# Patient Record
Sex: MALE | Race: WHITE | NOT HISPANIC OR LATINO | Employment: OTHER | URBAN - METROPOLITAN AREA
[De-identification: names, ages, dates, MRNs, and addresses within clinical notes are randomized per-mention and may not be internally consistent; named-entity substitution may affect disease eponyms.]

---

## 2017-10-19 ENCOUNTER — TELEPHONE (OUTPATIENT)
Dept: NEUROLOGY | Facility: CLINIC | Age: 72
End: 2017-10-19

## 2017-10-19 NOTE — TELEPHONE ENCOUNTER
Appt offered/accepted with SALLY De La Cruz for 11/1/17 @ 1:15pm. Appt letter to be mailed. Clarice, pt's wife advised to bring YOBANI scan results and current med lists.

## 2017-10-19 NOTE — TELEPHONE ENCOUNTER
----- Message from Stephen Rosales sent at 10/19/2017  3:14 PM CDT -----  Contact: Elizabeth (wife) @ [phone]  Elizabeth is calling to schedule a NP appt due to parkinson's. Pls call.

## 2017-11-14 ENCOUNTER — OFFICE VISIT (OUTPATIENT)
Dept: NEUROLOGY | Facility: CLINIC | Age: 72
End: 2017-11-14
Payer: MEDICARE

## 2017-11-14 VITALS
HEIGHT: 72 IN | DIASTOLIC BLOOD PRESSURE: 92 MMHG | BODY MASS INDEX: 31.32 KG/M2 | SYSTOLIC BLOOD PRESSURE: 123 MMHG | HEART RATE: 59 BPM | WEIGHT: 231.25 LBS

## 2017-11-14 DIAGNOSIS — Z86.73 HISTORY OF STROKE: ICD-10-CM

## 2017-11-14 DIAGNOSIS — G20.A1 PD (PARKINSON'S DISEASE): ICD-10-CM

## 2017-11-14 DIAGNOSIS — E11.42 TYPE 2 DIABETES MELLITUS WITH DIABETIC POLYNEUROPATHY, WITHOUT LONG-TERM CURRENT USE OF INSULIN: ICD-10-CM

## 2017-11-14 DIAGNOSIS — F32.A DEPRESSION, UNSPECIFIED DEPRESSION TYPE: ICD-10-CM

## 2017-11-14 PROBLEM — E11.49 DIABETES MELLITUS WITH NEUROLOGICAL MANIFESTATIONS: Status: ACTIVE | Noted: 2017-11-14

## 2017-11-14 PROCEDURE — 99203 OFFICE O/P NEW LOW 30 MIN: CPT | Mod: S$PBB,GC,, | Performed by: STUDENT IN AN ORGANIZED HEALTH CARE EDUCATION/TRAINING PROGRAM

## 2017-11-14 PROCEDURE — 99999 PR PBB SHADOW E&M-EST. PATIENT-LVL III: CPT | Mod: PBBFAC,GC,, | Performed by: STUDENT IN AN ORGANIZED HEALTH CARE EDUCATION/TRAINING PROGRAM

## 2017-11-14 PROCEDURE — 99213 OFFICE O/P EST LOW 20 MIN: CPT | Mod: PBBFAC | Performed by: STUDENT IN AN ORGANIZED HEALTH CARE EDUCATION/TRAINING PROGRAM

## 2017-11-14 RX ORDER — DONEPEZIL HYDROCHLORIDE 5 MG/1
5 TABLET, FILM COATED ORAL NIGHTLY
COMMUNITY

## 2017-11-14 RX ORDER — WARFARIN SODIUM 5 MG/1
5 TABLET ORAL
COMMUNITY
End: 2020-12-07

## 2017-11-14 RX ORDER — LISINOPRIL 20 MG/1
20 TABLET ORAL DAILY
COMMUNITY
Start: 2014-05-22 | End: 2019-01-22

## 2017-11-14 RX ORDER — SERTRALINE HYDROCHLORIDE 50 MG/1
50 TABLET, FILM COATED ORAL DAILY
COMMUNITY

## 2017-11-14 RX ORDER — CARBIDOPA AND LEVODOPA 25; 100 MG/1; MG/1
25-100 TABLET ORAL 4 TIMES DAILY
COMMUNITY
Start: 2017-09-20 | End: 2018-02-27

## 2017-11-14 RX ORDER — METFORMIN HYDROCHLORIDE 500 MG/1
500 TABLET, EXTENDED RELEASE ORAL DAILY
COMMUNITY
Start: 2017-10-04

## 2017-11-14 RX ORDER — GLIMEPIRIDE 2 MG/1
2 TABLET ORAL 2 TIMES DAILY
COMMUNITY
Start: 2017-09-28

## 2017-11-14 RX ORDER — PROPRANOLOL HYDROCHLORIDE 60 MG/1
60 CAPSULE, EXTENDED RELEASE ORAL DAILY
COMMUNITY
Start: 2017-10-07

## 2017-11-14 RX ORDER — SIMVASTATIN 40 MG/1
40 TABLET, FILM COATED ORAL NIGHTLY
COMMUNITY

## 2017-11-14 NOTE — PATIENT INSTRUCTIONS
Test sinemet (carbidopa-levodopa) to see if it works on your tremor as well as your legs (make them move better when you start walking).    Take one pill about 4 hours apart, 3 times a day.  Example:  Take one at 8am, 12pm and 4pm.  Do this for 3-4 days.    THEN, DOUBLE THE dose to 2 pills, 3 times per day.    If you get nauseated, please stop taking it.  If you feel it HELPS your tremor or your walking, please let me know by [employer] message.  If you cannot tell any difference, then let me know (and stop taking it).

## 2017-11-14 NOTE — PROGRESS NOTES
Patient Name: Clint Torres  MRN: [id number]    CC: Parkinson's disease    HPI: Clint Torres is a 72 y.o. male who was diagnosed with Parkinson's disease, who presents to [facility] care. He says that in about 2013, he started to notice a right hand tremor. Since then he has had worsening tremor and associated freezing while walking, slow movements/responses, swallowing difficulty, mild constipation and urinary retention. He also reports intermittent dizziness. He does not sleep well at night due to carpal tunnel, but denies any vivid dreams which his wife agrees with.  He denies any other strange behavior including hypersexuality, gambling or money spending. He denies any visual or auditory hallucinations.    He has been seen by an outside Neurologist, and has tried primidone, sinemet QID and the neupro patch with no improvement. He and his wife say that even shortly after taking his sinemet, he does not see any change in his symptoms. He is most concerned about his tremors and freezing, and says one time he was frozen in one spot for 1 hour.     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 He used to golf but is no longer able to. He does endorse depressed mood, and is taking sertraline. He is currently doing speech therapy for dysphagia.      ROS:   Review of Systems   Constitutional: Negative for malaise/fatigue. Negative for weight loss.   HENT: Negative for hearing loss.   Eyes: Negative for blurred vision and double vision.   Respiratory: Negative for shortness of breath and stridor.   Cardiovascular: Negative for chest pain and palpitations.   Gastrointestinal: Negative for nausea, vomiting and constipation.   Genitourinary: Negative for frequency. Negative for urgency.   Musculoskeletal: Negative for joint pain. Negative for myalgias and falls.   Skin: Negative for rash.   Neurological: As above  Endo/Heme/Allergies: Does not bruise/bleed easily.   Psychiatric/Behavioral:  Negative for memory loss, hallucinations. +depression. The patient is not nervous/anxious.      Past Medical History  No past medical history on file.    Medications    Current Outpatient Prescriptions:     carbidopa-levodopa  mg (SINEMET)  mg per tablet, Take  tablets by mouth 4 (four) times daily., Disp: , Rfl:     donepezil (ARICEPT) 5 MG tablet, Take 5 mg by mouth every evening., Disp: , Rfl:     glimepiride (AMARYL) 2 MG tablet, Take 2 mg by mouth 2 (two) times daily., Disp: , Rfl:     lisinopril (PRINIVIL,ZESTRIL) 20 MG tablet, Take 20 mg by mouth once daily., Disp: , Rfl:     metFORMIN (GLUCOPHAGE-XR) 500 MG 24 hr tablet, Take 500 mg by mouth once daily., Disp: , Rfl:     propranolol (INDERAL LA) 60 MG 24 hr capsule, Take 60 mg by mouth once daily., Disp: , Rfl:     sertraline (ZOLOFT) 50 MG tablet, Take 50 mg by mouth once daily., Disp: , Rfl:     simvastatin (ZOCOR) 40 MG tablet, Take 40 mg by mouth every evening., Disp: , Rfl:     warfarin (COUMADIN) 5 MG tablet, Take 5 mg by mouth every Mon, Wed, Fri. Sun Tues Thurs Sat....half tablet, Disp: , Rfl:     rotigotine (NEUPRO) 4 mg/24 hour, Place 1 patch onto the skin once daily., Disp: , Rfl:     Allergies  Review of patient's allergies indicates:  No Known Allergies    Social History  Social History     Social History    Marital status:      Spouse name: N/A    Number of children: N/A    Years of education: N/A     Occupational History    Not on file.     Social History Main Topics    Smoking status: Never Smoker    Smokeless tobacco: Never Used    Alcohol use 0.6 oz/week     1 Cans of beer per week    Drug use: Unknown    Sexual activity: Not on file     Other Topics Concern    Not on file     Social History Narrative    No narrative on file       Family History  No family history on file.    Physical Exam  BP (!) 123/92   Pulse (!) 59   Ht 6' (1.829 m)   Wt 104.9 kg (231 lb 4.2 oz)   BMI 31.36 kg/m²        Constitutional  Well-developed, well-nourished, appears stated age   Neurological    * Mental status      - Orientation  Oriented to person, place, time, and situation     - Memory   2/3 recall at 5 minutes, able to name past 3 presidents.     - Attention/concentration  Attentive, vigilant during exam     - Language  Naming & repetition intact, +2-step commands     - Fund of knowledge  Aware of current events     - Executive  Well-organized thoughts     - Other     * Cranial nerves       - CN II  PERRL, visual fields full to confrontation     - CN III, IV, VI  Extraocular movements full, normal pursuits and saccades     - CN V  Sensation V1 - V3 intact     - CN VII  Face strong and symmetric bilaterally. Masked facies     - CN VIII  Hearing intact bilaterally     - CN IX, X  Palate raises midline and symmetric     - CN XI  SCM and trapezius 5/5 bilaterally     - CN XII  Tongue midline   * Motor  Muscle bulk normal, strength 5/5 throughout. Tone increased in BUE with cogwheel rigidity (R>>L)   * Movement  Bradykinetic. Resting hand tremor (R>>L)   * Sensory   Intact to temperature and vibration throughout   * Coordination  No dysmetria with finger-to-nose or heel-to-shin   * Gait  Slow, mild shuffling gait with reduced arm swing.    * Deep tendon reflexes  2+ and symmetric throughout   Babinski equivocal bilaterally       Lab and Test Results    No results found for: WBC, HGB, HCT, PLT  No results found for: GLU, NA, K, CL, CO2, BUN, CREATININE, CALCIUM, MG, PHOS  No results found for: ALB, ALKPHOS, ALT, AST      Images:   MRI brain without contrast (OSH)  Mild midbrain atrophy. Otherwise no acute intracranial abnormalities.    YOBANI Scan (OSH)  Decreased activity bilaterally L>R    Independently reviewed YES      Assessment and Plan    Clint Torres is a 72 y.o. male who was diagnosed with Parkinson's disease a few years ago, and has tried multiple medications with no benefit. At this time I agree with the  diagnosis of PD. I will give the patient instructions for a sinemet trial, and have instructed them on what changes to look out for. If this still provides no relief of symptoms, we will need to discuss other options. I also advised them to not continue the neupro patch or start the rasagiline. They are in agreement and all questions were addressed, I will follow up in 1 month.      Problem List Items Addressed This Visit        Neuro    History of stroke    Overview     2012         Current Assessment & Plan     -- on warfarin for Afib  -- simvastatin 40mg         PD (Parkinson's disease)    Overview     2013 right hand tremor, freezing of gait         Current Assessment & Plan     -- will give sinemet trial as instructed  -- f/u in 1 month  -- if no improvement with trial will consider other options            Psychiatric    Depression    Current Assessment & Plan     -- on zoloft            Endocrine    Diabetes mellitus with neurological manifestations    Current Assessment & Plan     -- managed by primary care  -- on metformin                     Bela Blunt MD  Neurology Resident   [facility]  [address] [place] [address]  Pager: [phone]

## 2017-11-15 PROBLEM — F32.A DEPRESSION: Status: ACTIVE | Noted: 2017-11-15

## 2017-11-15 NOTE — ASSESSMENT & PLAN NOTE
-- will give sinemet trial as instructed  -- f/u in 1 month  -- if no improvement with trial will consider other options

## 2017-11-29 ENCOUNTER — TELEPHONE (OUTPATIENT)
Dept: NEUROLOGY | Facility: CLINIC | Age: 72
End: 2017-11-29

## 2017-11-29 NOTE — TELEPHONE ENCOUNTER
----- Message from Joana Foss sent at 11/29/2017  4:09 PM CST -----  Contact: Pt wife Clarice Torres is calling to schedule a f/u visit with doctor. Says pt was told to f/u in 4 wks     Mrs Torres is requesting a callback at [phone] to schedule f/u visit    Thanks

## 2017-11-29 NOTE — TELEPHONE ENCOUNTER
Advised via VM that a f/u appt has been made for 12/12/17 @ 2:00pm with Dr. Mirza. Callback # was provided if this day/time needed to be changed.

## 2017-12-12 ENCOUNTER — OFFICE VISIT (OUTPATIENT)
Dept: NEUROLOGY | Facility: CLINIC | Age: 72
End: 2017-12-12
Payer: MEDICARE

## 2017-12-12 VITALS
HEART RATE: 78 BPM | BODY MASS INDEX: 32.94 KG/M2 | SYSTOLIC BLOOD PRESSURE: 133 MMHG | HEIGHT: 71 IN | WEIGHT: 235.25 LBS | DIASTOLIC BLOOD PRESSURE: 89 MMHG

## 2017-12-12 DIAGNOSIS — G20.A1 PD (PARKINSON'S DISEASE): Primary | ICD-10-CM

## 2017-12-12 DIAGNOSIS — G62.9 NEUROPATHY: ICD-10-CM

## 2017-12-12 DIAGNOSIS — R25.1 TREMOR: ICD-10-CM

## 2017-12-12 DIAGNOSIS — E11.42 TYPE 2 DIABETES MELLITUS WITH DIABETIC POLYNEUROPATHY, WITHOUT LONG-TERM CURRENT USE OF INSULIN: ICD-10-CM

## 2017-12-12 DIAGNOSIS — R26.9 ABNORMALITY OF GAIT AND MOBILITY: ICD-10-CM

## 2017-12-12 PROCEDURE — 99999 PR PBB SHADOW E&M-EST. PATIENT-LVL IV: CPT | Mod: PBBFAC,GC,, | Performed by: STUDENT IN AN ORGANIZED HEALTH CARE EDUCATION/TRAINING PROGRAM

## 2017-12-12 PROCEDURE — 99213 OFFICE O/P EST LOW 20 MIN: CPT | Mod: S$PBB,GC,, | Performed by: STUDENT IN AN ORGANIZED HEALTH CARE EDUCATION/TRAINING PROGRAM

## 2017-12-12 PROCEDURE — 99214 OFFICE O/P EST MOD 30 MIN: CPT | Mod: PBBFAC | Performed by: STUDENT IN AN ORGANIZED HEALTH CARE EDUCATION/TRAINING PROGRAM

## 2017-12-12 NOTE — PATIENT INSTRUCTIONS
Increase sinemet to 2 pills 4 times per day for 4 days  Then decrease to 1 pill three times per day for 2 days  Then stop    PLEASE CALL the clinic to report if symptoms improve or worsen

## 2017-12-13 NOTE — ASSESSMENT & PLAN NOTE
-- Increase sinemet for 4 days, then rapidly titrate off  -- patient/wife will call with how this affects symptoms  -- if symptoms worsen will resume sinemet  -- if symptoms are stable will consider starting amantadine

## 2017-12-13 NOTE — PROGRESS NOTES
Patient Name: Clint Torres  MRN: [id number]    CC: Parkinson's disease    Interval history: Clint Torres is a 72 y.o. male who presents for follow up. He says that he did the sinemet trial as instructed, and did not see any difference in his symptoms. His wife agrees. He still complains of a bad tremor and freezing that interfere with his life. He denies any nausea, dyskinesias or other side effects.      Clinic 11/14/17  HPI: Mr. Torres is a 72 year old male who was diagnosed with Parkinson's disease, who presents to [facility]. He says that in about 2013, he started to notice a right hand tremor. Since then he has had worsening tremor and associated freezing while walking, slow movements/responses, swallowing difficulty, mild constipation and urinary retention. He also reports intermittent dizziness. He does not sleep well at night due to carpal tunnel, but denies any vivid dreams which his wife agrees with.  He denies any other strange behavior including hypersexuality, gambling or money spending. He denies any visual or auditory hallucinations.    He has been seen by an outside Neurologist, and has tried primidone, sinemet QID and the neupro patch with no improvement. He and his wife say that even shortly after taking his sinemet, he does not see any change in his symptoms. He is most concerned about his tremors and freezing, and says one time he was frozen in one spot for 1 hour.     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 He used to golf but is no longer able to. He does endorse depressed mood, and is taking sertraline. He is currently doing speech therapy for dysphagia.      ROS:   Review of Systems   Constitutional: Negative for malaise/fatigue. Negative for weight loss.   HENT: Negative for hearing loss.   Eyes: Negative for blurred vision and double vision.   Respiratory: Negative for shortness of breath and stridor.   Cardiovascular: Negative for chest pain and  "palpitations.   Gastrointestinal: Negative for nausea, vomiting and constipation.   Genitourinary: Negative for frequency. Negative for urgency.   Musculoskeletal: Negative for joint pain. Negative for myalgias and falls.   Skin: Negative for rash.   Neurological: As above  Endo/Heme/Allergies: Does not bruise/bleed easily.   Psychiatric/Behavioral: Negative for memory loss, hallucinations. +depression. The patient is not nervous/anxious.      Medications    Current Outpatient Prescriptions:     carbidopa-levodopa  mg (SINEMET)  mg per tablet, Take  tablets by mouth 4 (four) times daily., Disp: , Rfl:     donepezil (ARICEPT) 5 MG tablet, Take 5 mg by mouth every evening., Disp: , Rfl:     glimepiride (AMARYL) 2 MG tablet, Take 2 mg by mouth 2 (two) times daily., Disp: , Rfl:     lisinopril (PRINIVIL,ZESTRIL) 20 MG tablet, Take 20 mg by mouth once daily., Disp: , Rfl:     metFORMIN (GLUCOPHAGE-XR) 500 MG 24 hr tablet, Take 500 mg by mouth once daily., Disp: , Rfl:     propranolol (INDERAL LA) 60 MG 24 hr capsule, Take 60 mg by mouth once daily., Disp: , Rfl:     sertraline (ZOLOFT) 50 MG tablet, Take 50 mg by mouth once daily., Disp: , Rfl:     simvastatin (ZOCOR) 40 MG tablet, Take 40 mg by mouth every evening., Disp: , Rfl:     warfarin (COUMADIN) 5 MG tablet, Take 5 mg by mouth every Mon, Wed, Fri. Sun Tues Thurs Sat....half tablet, Disp: , Rfl:     rotigotine (NEUPRO) 4 mg/24 hour, Place 1 patch onto the skin once daily., Disp: , Rfl:     Physical Exam  /89   Pulse 78   Ht 5' 11" (1.803 m)   Wt 106.7 kg (235 lb 3.7 oz)   BMI 32.81 kg/m²       Constitutional  Well-developed, well-nourished, appears stated age   Neurological    * Mental status      - Orientation  Oriented to person, place, time, and situation     - Other     * Cranial nerves       - CN II  PERRL, visual fields full to confrontation     - CN III, IV, VI  Extraocular movements full, normal pursuits and saccades "     - CN V  Sensation V1 - V3 intact     - CN VII  Face strong and symmetric bilaterally. Dysmimia     - CN VIII  Hearing intact bilaterally     - CN IX, X  Palate raises midline and symmetric     - CN XI  SCM and trapezius 5/5 bilaterally     - CN XII  Tongue midline   * Motor  Muscle bulk normal, strength 5/5 throughout. Tone mildly increased in BUE with cogwheel rigidity (R>>L)   * Movement  Mildly bradykinetic. Only rarely intermittent resting hand tremor (R>>L)   * Sensory   Mildly decreased to vibration in bilateral lower extremities at toes.   * Coordination  No dysmetria with finger-to-nose or heel-to-shin   * Gait  Good casual gait with normal stride length, mildly reduced arm swing.    * Deep tendon reflexes  2+ and symmetric throughout   Babinski equivocal bilaterally       Lab and Test Results    No results found for: WBC, HGB, HCT, PLT  No results found for: GLU, NA, K, CL, CO2, BUN, CREATININE, CALCIUM, MG, PHOS  No results found for: ALB, ALKPHOS, ALT, AST      Images:   MRI brain without contrast ([facility])  Mild midbrain atrophy. Otherwise no acute intracranial abnormalities.    YOBANI Scan (OSH)  Decreased activity bilaterally L>R    Independently reviewed YES    Assessment and Plan    Clint Torres is a 72 y.o. male who was diagnosed with Parkinson's disease a few years ago, and has tried multiple medications with no benefit. At this time I agree with the diagnosis of PD. He reports that he did not respond to the sinemet trial, however he appears improved on my examination with less rigidity, infrequent tremor and less rigidity. We decided to test sinemet one more time, and I have instructed the patient to increase to 4 times per day, followed by a rapid decrease. This will either prove to the patient that the sinemet is helping, or prove to use that there is no benefit. If he sees no difference after rapidly decreasing the sinemet, we can consider amantadine. They are in agreement and all questions were  addressed, I will follow up in 2 months and have asked them to call with how this trial goes.    He also had reported numbness in his feet which is likely related to his diabetes. I have ordered some basic labs to rule out other causes.      Problem List Items Addressed This Visit        Neuro    PD (Parkinson's disease) - Primary    Overview     2013 right hand tremor, freezing of gait         Current Assessment & Plan     -- Increase sinemet for 4 days, then rapidly titrate off  -- patient/wife will call with how this affects symptoms  -- if symptoms worsen will resume sinemet  -- if symptoms are stable will consider starting amantadine         Neuropathy    Current Assessment & Plan     -- likely due to DM         Relevant Orders    HEMOGLOBIN A1C (Completed)    TSH (Completed)    VITAMIN B1    VITAMIN B12 (Completed)    VITAMIN B6       Endocrine    Diabetes mellitus with neurological manifestations    Current Assessment & Plan     -- managed by primary care  -- on metformin         Relevant Orders    HEMOGLOBIN A1C (Completed)      Other Visit Diagnoses     Tremor         Relevant Orders    TSH (Completed)    Abnormality of gait and mobility         Relevant Orders    VITAMIN B12 (Completed)              Bela Blunt MD  Neurology Resident   [facility] [address] [place] [address]  Pager: [phone]

## 2018-02-27 ENCOUNTER — OFFICE VISIT (OUTPATIENT)
Dept: NEUROLOGY | Facility: CLINIC | Age: 73
End: 2018-02-27
Payer: MEDICARE

## 2018-02-27 VITALS
WEIGHT: 239 LBS | SYSTOLIC BLOOD PRESSURE: 107 MMHG | HEIGHT: 71 IN | DIASTOLIC BLOOD PRESSURE: 70 MMHG | BODY MASS INDEX: 33.46 KG/M2 | HEART RATE: 86 BPM

## 2018-02-27 DIAGNOSIS — Z86.73 HISTORY OF STROKE: ICD-10-CM

## 2018-02-27 DIAGNOSIS — F32.A DEPRESSION, UNSPECIFIED DEPRESSION TYPE: ICD-10-CM

## 2018-02-27 DIAGNOSIS — G20.A1 PD (PARKINSON'S DISEASE): Primary | ICD-10-CM

## 2018-02-27 PROCEDURE — 99213 OFFICE O/P EST LOW 20 MIN: CPT | Mod: PBBFAC | Performed by: STUDENT IN AN ORGANIZED HEALTH CARE EDUCATION/TRAINING PROGRAM

## 2018-02-27 PROCEDURE — 99999 PR PBB SHADOW E&M-EST. PATIENT-LVL III: CPT | Mod: PBBFAC,GC,, | Performed by: STUDENT IN AN ORGANIZED HEALTH CARE EDUCATION/TRAINING PROGRAM

## 2018-02-27 PROCEDURE — 99214 OFFICE O/P EST MOD 30 MIN: CPT | Mod: S$PBB,GC,, | Performed by: STUDENT IN AN ORGANIZED HEALTH CARE EDUCATION/TRAINING PROGRAM

## 2018-02-27 RX ORDER — AMANTADINE HYDROCHLORIDE 100 MG/1
100 TABLET ORAL DAILY
Qty: 90 TABLET | Refills: 3 | Status: SHIPPED | OUTPATIENT
Start: 2018-02-27 | End: 2018-04-20

## 2018-02-27 RX ORDER — PYRIDOXINE HCL (VITAMIN B6) 100 MG
50 TABLET ORAL DAILY
COMMUNITY

## 2018-02-27 RX ORDER — GABAPENTIN 300 MG/1
300 CAPSULE ORAL DAILY
COMMUNITY
Start: 2018-01-23

## 2018-03-01 NOTE — PROGRESS NOTES
Patient Name: Clint Torres  MRN: [id number]    CC: Parkinson's disease    Interval history: Clint Torres is a 72 y.o. male who presents for follow up. At his last visit we decided to rapidly taper off his sinemet to help prove whether this has had an affect on his symptoms. Both he and his wife feel there was a mild worsening of his symptoms after stopping the sinemet, but that it was mostly insignificant. They truly feel that it has had minimal benefit for him. His main complaint continues to be his tremor and gait freezing.      Clinic 11/14/17  HPI: Mr. Torres is a 72 year old male who was diagnosed with Parkinson's disease, who presents to [facility]. He says that in about 2013, he started to notice a right hand tremor. Since then he has had worsening tremor and associated freezing while walking, slow movements/responses, swallowing difficulty, mild constipation and urinary retention. He also reports intermittent dizziness. He does not sleep well at night due to carpal tunnel, but denies any vivid dreams which his wife agrees with.  He denies any other strange behavior including hypersexuality, gambling or money spending. He denies any visual or auditory hallucinations.    He has been seen by an outside Neurologist, and has tried primidone, sinemet QID and the neupro patch with no improvement. He and his wife say that even shortly after taking his sinemet, he does not see any change in his symptoms. He is most concerned about his tremors and freezing, and says one time he was frozen in one spot for 1 hour.     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 He used to golf but is no longer able to. He does endorse depressed mood, and is taking sertraline. He is currently doing speech therapy for dysphagia.      ROS:   Review of Systems   Eyes: +blurred vision, negative for double vision.   ENT: +dry mouth  Respiratory: Negative for shortness of breath and stridor.  "  Cardiovascular: Negative for chest pain and palpitations.   Gastrointestinal: Negative for nausea, vomiting. +constipation.   Genitourinary: Negative for frequency. Negative for urgency.   Endo/Heme/Allergies: Does not bruise/bleed easily.   Psychiatric/Behavioral: Negative for memory loss, hallucinations. +depression. The patient is not nervous/anxious.      Medications    Current Outpatient Prescriptions:     donepezil (ARICEPT) 5 MG tablet, Take 5 mg by mouth every evening., Disp: , Rfl:     gabapentin (NEURONTIN) 300 MG capsule, , Disp: , Rfl:     glimepiride (AMARYL) 2 MG tablet, Take 2 mg by mouth 2 (two) times daily., Disp: , Rfl:     lisinopril (PRINIVIL,ZESTRIL) 20 MG tablet, Take 20 mg by mouth once daily., Disp: , Rfl:     metFORMIN (GLUCOPHAGE-XR) 500 MG 24 hr tablet, Take 500 mg by mouth once daily., Disp: , Rfl:     propranolol (INDERAL LA) 60 MG 24 hr capsule, Take 60 mg by mouth once daily., Disp: , Rfl:     pyridoxine, vitamin B6, (VITAMIN B-6) 100 MG Tab, Take 50 mg by mouth once daily., Disp: , Rfl:     sertraline (ZOLOFT) 50 MG tablet, Take 50 mg by mouth once daily., Disp: , Rfl:     simvastatin (ZOCOR) 40 MG tablet, Take 40 mg by mouth every evening., Disp: , Rfl:     warfarin (COUMADIN) 5 MG tablet, Take 5 mg by mouth every Mon, Wed, Fri. Sun Tues Thurs Sat....half tablet, Disp: , Rfl:     amantadine HCl 100 mg Tab, Take 1 tablet (100 mg total) by mouth once daily., Disp: 90 tablet, Rfl: 3    rotigotine (NEUPRO) 4 mg/24 hour, Place 1 patch onto the skin once daily., Disp: , Rfl:     Physical Exam  /70   Pulse 86   Ht 5' 11" (1.803 m)   Wt 108.4 kg (238 lb 15.7 oz)   BMI 33.33 kg/m²       Constitutional  Well-developed, well-nourished, appears stated age   Neurological    * Mental status      - Orientation  Oriented to person, place, time, and situation     - Other     * Cranial nerves       - CN II  PERRL, visual fields full to confrontation     - CN III, IV, VI  " Extraocular movements full, normal pursuits and saccades     - CN V  Sensation V1 - V3 intact     - CN VII  Face strong and symmetric bilaterally. Dysmimia     - CN VIII  Hearing intact bilaterally     - CN IX, X  Palate raises midline and symmetric     - CN XI  SCM and trapezius 5/5 bilaterally     - CN XII  Tongue midline   * Motor  Muscle bulk normal, strength 5/5 throughout. Tone mildly increased in BUE with cogwheel rigidity (R>>L)   * Movement  Bradykinetic. Intermittent resting hand tremor (R>>L)   * Sensory   Mildly decreased to vibration in bilateral lower extremities at toes.   * Coordination  No dysmetria with finger-to-nose or heel-to-shin   * Gait  Good casual gait with normal stride length, reduced arm swing in right   * Deep tendon reflexes  2+ and symmetric throughout       Lab and Test Results    No results found for: WBC, HGB, HCT, PLT  No results found for: GLU, NA, K, CL, CO2, BUN, CREATININE, CALCIUM, MG, PHOS  No results found for: ALB, ALKPHOS, ALT, AST      Images:   MRI brain without contrast (OS[facility])  Mild midbrain atrophy. Otherwise no acute intracranial abnormalities.    YOBANI Scan (OSH)  Decreased activity bilaterally L>R    Independently reviewed YES    Assessment and Plan    Clint Torres is a 72 y.o. male who was diagnosed with Parkinson's disease a few years ago, and has tried multiple medications with no benefit. At this time I agree with the diagnosis of PD. He did not respond to sinemet, so after discussing options we decided to start amantadine. Because he has a history of dry mouth related to past radiation therapy, as well as some constipation we decided to start at 1/2 dose (100mg daily) to see how he tolerates it. If he sees improvement in his symptoms without significant side effects we can consider increasing the dose in the future.  I will follow up with him in 3 months and they will call if there are any issues.    Problem List Items Addressed This Visit        Neuro     History of stroke    Overview     2012         Current Assessment & Plan     -- on warfarin for Afib  -- simvastatin 40mg         PD (Parkinson's disease) - Primary    Overview     2013 right hand tremor, freezing of gait         Current Assessment & Plan     -- discontinue sinemet  -- start amantadine 100mg daily  -- patient educated on side effects            Psychiatric    Depression    Current Assessment & Plan     -- stable on zoloft                   Bela Blunt MD  Neurology Resident   [facility] [address] [place] [address]  Pager: [phone]

## 2018-03-07 NOTE — PROGRESS NOTES
Patient seen and examined.  I agree with the history, exam, assessment and plan within the resident's note.            Matthew Husain MD, MPH  Division of Movement and Memory Disorders  [facility]

## 2018-03-19 ENCOUNTER — PATIENT MESSAGE (OUTPATIENT)
Dept: NEUROLOGY | Facility: CLINIC | Age: 73
End: 2018-03-19

## 2018-04-12 ENCOUNTER — TELEPHONE (OUTPATIENT)
Dept: NEUROLOGY | Facility: CLINIC | Age: 73
End: 2018-04-12

## 2018-04-12 NOTE — TELEPHONE ENCOUNTER
----- Message from Stephen Rosales sent at 4/9/2018  3:42 PM CDT -----  Contact: Clarice (wife) @ [phone]  Clarice states amantadine HCl 100 mg Tab is not working, and is asking to speak w/ the doctor about getting rasagiline filled. Clarice states she spoke w/ Dr. Husain about this on 03/29 through [facility], but the medication has not been filled. Pls call.

## 2018-04-19 ENCOUNTER — PATIENT MESSAGE (OUTPATIENT)
Dept: NEUROLOGY | Facility: CLINIC | Age: 73
End: 2018-04-19

## 2018-04-20 RX ORDER — RASAGILINE 1 MG/1
TABLET ORAL
Qty: 90 TABLET | Refills: 3 | Status: SHIPPED | OUTPATIENT
Start: 2018-04-20 | End: 2018-05-22

## 2018-05-22 ENCOUNTER — OFFICE VISIT (OUTPATIENT)
Dept: NEUROLOGY | Facility: CLINIC | Age: 73
End: 2018-05-22
Payer: MEDICARE

## 2018-05-22 VITALS
HEART RATE: 133 BPM | WEIGHT: 234.13 LBS | HEIGHT: 71 IN | BODY MASS INDEX: 32.78 KG/M2 | SYSTOLIC BLOOD PRESSURE: 98 MMHG | DIASTOLIC BLOOD PRESSURE: 59 MMHG

## 2018-05-22 DIAGNOSIS — G20.A1 PD (PARKINSON'S DISEASE): Primary | ICD-10-CM

## 2018-05-22 DIAGNOSIS — Z86.73 HISTORY OF STROKE: ICD-10-CM

## 2018-05-22 DIAGNOSIS — G62.9 NEUROPATHY: ICD-10-CM

## 2018-05-22 DIAGNOSIS — E11.42 TYPE 2 DIABETES MELLITUS WITH DIABETIC POLYNEUROPATHY, WITHOUT LONG-TERM CURRENT USE OF INSULIN: ICD-10-CM

## 2018-05-22 DIAGNOSIS — F32.A DEPRESSION, UNSPECIFIED DEPRESSION TYPE: ICD-10-CM

## 2018-05-22 PROCEDURE — 99999 PR PBB SHADOW E&M-EST. PATIENT-LVL IV: CPT | Mod: PBBFAC,GC,, | Performed by: STUDENT IN AN ORGANIZED HEALTH CARE EDUCATION/TRAINING PROGRAM

## 2018-05-22 PROCEDURE — 99213 OFFICE O/P EST LOW 20 MIN: CPT | Mod: S$PBB,GC,, | Performed by: STUDENT IN AN ORGANIZED HEALTH CARE EDUCATION/TRAINING PROGRAM

## 2018-05-22 PROCEDURE — 99214 OFFICE O/P EST MOD 30 MIN: CPT | Mod: PBBFAC | Performed by: STUDENT IN AN ORGANIZED HEALTH CARE EDUCATION/TRAINING PROGRAM

## 2018-05-22 RX ORDER — PRAMIPEXOLE DIHYDROCHLORIDE 0.25 MG/1
0.5 TABLET ORAL 3 TIMES DAILY
Qty: 180 TABLET | Refills: 11 | Status: SHIPPED | OUTPATIENT
Start: 2018-05-22 | End: 2018-09-11

## 2018-05-22 NOTE — PROGRESS NOTES
Patient Name: Clint Torres  MRN: [id number]    CC: Parkinson's disease    Interval history: Clint Torres is a 72 y.o. male who presents for follow up. At his last visit we switched him to amantadine which he did not tolerate due to side effects. We then recommended starting rasagiline which he tried for 2 weeks, but again saw no improvement. He feels confident that no medication has helped his symptoms. He says he now notices a mild tremor in his left hand, otherwise no new issues.      Clinic 2/27/18  At his last visit we decided to rapidly taper off his sinemet to help prove whether this has had an affect on his symptoms. Both he and his wife feel there was a mild worsening of his symptoms after stopping the sinemet, but that it was mostly insignificant. They truly feel that it has had minimal benefit for him. His main complaint continues to be his tremor and gait freezing.      Clinic 11/14/17  HPI: Mr. Torres is a 72 year old male who was diagnosed with Parkinson's disease, who presents to [facility] care. He says that in about 2013, he started to notice a right hand tremor. Since then he has had worsening tremor and associated freezing while walking, slow movements/responses, swallowing difficulty, mild constipation and urinary retention. He also reports intermittent dizziness. He does not sleep well at night due to carpal tunnel, but denies any vivid dreams which his wife agrees with.  He denies any other strange behavior including hypersexuality, gambling or money spending. He denies any visual or auditory hallucinations.    He has been seen by an outside Neurologist, and has tried primidone, sinemet QID and the neupro patch with no improvement. He and his wife say that even shortly after taking his sinemet, he does not see any change in his symptoms. He is most concerned about his tremors and freezing, and says one time he was frozen in one spot for 1 hour.     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 He used to golf but is no longer able to. He does endorse depressed mood, and is taking sertraline. He is currently doing speech therapy for dysphagia.      ROS:   Review of Systems   Eyes: +blurred vision, negative for double vision.   ENT: +dry mouth  Respiratory: Negative for shortness of breath and stridor.   Cardiovascular: Negative for chest pain and palpitations.   Gastrointestinal: Negative for nausea, vomiting. +constipation.   Genitourinary: Negative for frequency. Negative for urgency.   Endo/Heme/Allergies: Does not bruise/bleed easily.   Psychiatric/Behavioral: Negative for memory loss, hallucinations. +depression. The patient is not nervous/anxious.      Medications    Current Outpatient Prescriptions:     donepezil (ARICEPT) 5 MG tablet, Take 5 mg by mouth every evening., Disp: , Rfl:     gabapentin (NEURONTIN) 300 MG capsule, , Disp: , Rfl:     glimepiride (AMARYL) 2 MG tablet, Take 2 mg by mouth 2 (two) times daily., Disp: , Rfl:     lisinopril (PRINIVIL,ZESTRIL) 20 MG tablet, Take 20 mg by mouth once daily., Disp: , Rfl:     metFORMIN (GLUCOPHAGE-XR) 500 MG 24 hr tablet, Take 500 mg by mouth once daily., Disp: , Rfl:     propranolol (INDERAL LA) 60 MG 24 hr capsule, Take 60 mg by mouth once daily., Disp: , Rfl:     pyridoxine, vitamin B6, (VITAMIN B-6) 100 MG Tab, Take 50 mg by mouth once daily., Disp: , Rfl:     sertraline (ZOLOFT) 50 MG tablet, Take 50 mg by mouth once daily., Disp: , Rfl:     simvastatin (ZOCOR) 40 MG tablet, Take 40 mg by mouth every evening., Disp: , Rfl:     warfarin (COUMADIN) 5 MG tablet, Take 5 mg by mouth every Mon, Wed, Fri. Sun Tues Thurs Sat....half tablet, Disp: , Rfl:     pramipexole (MIRAPEX) 0.25 MG tablet, Take 2 tablets (0.5 mg total) by mouth 3 (three) times daily., Disp: 180 tablet, Rfl: 11    rasagiline (AZILECT) 1 mg Tab, Take 0.5 tablet (0.5mg) once a day for 1 week, then take 1 tablet (1mg) once a day thereafter.,  "Disp: 90 tablet, Rfl: 3    Physical Exam  BP (!) 98/59   Pulse (!) 133   Ht 5' 11" (1.803 m)   Wt 106.2 kg (234 lb 2.1 oz)   BMI 32.65 kg/m²       Constitutional  Well-developed, well-nourished, appears stated age   Neurological    * Mental status      - Orientation  Oriented to person, place, time, and situation     - Other     * Cranial nerves       - CN II  PERRL, visual fields full to confrontation     - CN III, IV, VI  Extraocular movements full, normal pursuits and saccades     - CN V  Sensation V1 - V3 intact     - CN VII  Face strong and symmetric bilaterally. Dysmimia     - CN VIII  Hearing intact bilaterally     - CN IX, X  Palate raises midline and symmetric     - CN XI  SCM and trapezius 5/5 bilaterally     - CN XII  Tongue midline   * Motor  Muscle bulk normal, strength 5/5 throughout. Tone mildly increased in BUE with cogwheel rigidity (R>>L)   * Movement  Bradykinetic. Intermittent resting hand tremor (R>>L)   * Sensory   Mildly decreased to vibration in bilateral lower extremities at toes.   * Coordination  No dysmetria with finger-to-nose or heel-to-shin   * Gait  Difficulty with initiation - short period of freezing. Then good casual gait with normal stride length, reduced arm swing in right   * Deep tendon reflexes  Not examined       Lab and Test Results    No results found for: WBC, HGB, HCT, PLT  No results found for: GLU, NA, K, CL, CO2, BUN, CREATININE, CALCIUM, MG, PHOS  No results found for: ALB, ALKPHOS, ALT, AST      Images:   MRI brain without contrast (OSH)  Mild midbrain atrophy. Otherwise no acute intracranial abnormalities.    YOBANI Scan (OSH)  Decreased activity bilaterally L>R    Independently reviewed YES    Assessment and Plan    Clint Torres is a 72 y.o. male who was diagnosed with Parkinson's disease a few years ago, and has tried multiple medications with no benefit (sinemet, primidone, rasagiline, neupro patch, amantadine). While this would make us reconsider the " diagnosis, he has had a YOBANI scan with confirmed decreased dopamine activity bilaterally (L>R) which correlates to his presentation. After discussing next options, we decided to start pramipexole. He was educated on the side effects, and will call with any concerns. Follow up in 6 weeks.    Problem List Items Addressed This Visit        Neuro    History of stroke    Overview     2012         PD (Parkinson's disease) - Primary    Overview     2013 right hand tremor, freezing of gait  Tried: sinemet, primidone, rasagiline, neupro patch, amantadine         Current Assessment & Plan     -- start pramipexole as instructed  -- follow up in 6 weeks         Neuropathy    Current Assessment & Plan     -- past labs significant for VitB6 deficiency  -- also Hx of DM  -- taking B6 supplements  -- gabapentin with some relief            Psychiatric    Depression    Current Assessment & Plan     -- on sertraline            Endocrine    Diabetes mellitus with neurological manifestations    Current Assessment & Plan     -- HbA1c in 2017 of 5.5  -- managed by primary care  -- on metformin                   Bela Blunt MD  Neurology Resident   [facility] [address] [place] [address]  Pager: [phone]

## 2018-05-22 NOTE — PATIENT INSTRUCTIONS
Pramipexole 0.25 mg titration    Weeks 1: Take 1/2 tab at AM, noon, and PM  Weeks 2: Take 1 tab at AM, noon, and PM  Weeks 3+: Take 2 tabs at AM, noon, and PM    Call me with an update anytime.    If adequately improved, can stop and maintain at any level of the titration.    If you do well with the 2 tabs at AM, noon, and PM, we can switch you to a 0.5 mg tablets in the future.

## 2018-05-22 NOTE — ASSESSMENT & PLAN NOTE
-- past labs significant for VitB6 deficiency  -- also Hx of DM  -- taking B6 supplements  -- gabapentin with some relief

## 2018-06-26 ENCOUNTER — OFFICE VISIT (OUTPATIENT)
Dept: NEUROLOGY | Facility: CLINIC | Age: 73
End: 2018-06-26
Payer: MEDICARE

## 2018-06-26 VITALS
SYSTOLIC BLOOD PRESSURE: 103 MMHG | DIASTOLIC BLOOD PRESSURE: 71 MMHG | HEIGHT: 71 IN | WEIGHT: 235.25 LBS | BODY MASS INDEX: 32.94 KG/M2 | HEART RATE: 110 BPM

## 2018-06-26 DIAGNOSIS — G20.A1 PD (PARKINSON'S DISEASE): Primary | ICD-10-CM

## 2018-06-26 DIAGNOSIS — E11.42 TYPE 2 DIABETES MELLITUS WITH DIABETIC POLYNEUROPATHY, WITHOUT LONG-TERM CURRENT USE OF INSULIN: ICD-10-CM

## 2018-06-26 DIAGNOSIS — F32.A DEPRESSION, UNSPECIFIED DEPRESSION TYPE: ICD-10-CM

## 2018-06-26 DIAGNOSIS — Z86.73 HISTORY OF STROKE: ICD-10-CM

## 2018-06-26 PROCEDURE — 99213 OFFICE O/P EST LOW 20 MIN: CPT | Mod: S$PBB,GC,, | Performed by: STUDENT IN AN ORGANIZED HEALTH CARE EDUCATION/TRAINING PROGRAM

## 2018-06-26 PROCEDURE — 99214 OFFICE O/P EST MOD 30 MIN: CPT | Mod: PBBFAC | Performed by: STUDENT IN AN ORGANIZED HEALTH CARE EDUCATION/TRAINING PROGRAM

## 2018-06-26 PROCEDURE — 99999 PR PBB SHADOW E&M-EST. PATIENT-LVL IV: CPT | Mod: PBBFAC,GC,, | Performed by: STUDENT IN AN ORGANIZED HEALTH CARE EDUCATION/TRAINING PROGRAM

## 2018-06-26 RX ORDER — TAMSULOSIN HYDROCHLORIDE 0.4 MG/1
0.4 CAPSULE ORAL DAILY
COMMUNITY
End: 2019-01-22

## 2018-06-26 NOTE — PROGRESS NOTES
Patient Name: Clint Torres  MRN: [id number]    CC: Parkinson's disease    Interval history: Clint Torres is a 72 y.o. male who presents for follow up. Since our last visit, he tried pramipexole (up to 0.5mg tid) but noticed no benefit. He admits that his tremor may have improved mildly, but his balance worsened. He says his blood pressure is typically low, but he doesn't know if this changed with the medication. He denies any new symptoms.      Clinic 5/22/18: At his last visit we switched him to amantadine which he did not tolerate due to side effects. We then recommended starting rasagiline which he tried for 2 weeks, but again saw no improvement. He feels confident that no medication has helped his symptoms. He says he now notices a mild tremor in his left hand, otherwise no new issues.    Clinic 2/27/18  At his last visit we decided to rapidly taper off his sinemet to help prove whether this has had an affect on his symptoms. Both he and his wife feel there was a mild worsening of his symptoms after stopping the sinemet, but that it was mostly insignificant. They truly feel that it has had minimal benefit for him. His main complaint continues to be his tremor and gait freezing.    Clinic 11/14/17  HPI: Mr. Torres is a 72 year old male who was diagnosed with Parkinson's disease, who presents to [facility] care. He says that in about 2013, he started to notice a right hand tremor. Since then he has had worsening tremor and associated freezing while walking, slow movements/responses, swallowing difficulty, mild constipation and urinary retention. He also reports intermittent dizziness. He does not sleep well at night due to carpal tunnel, but denies any vivid dreams which his wife agrees with.  He denies any other strange behavior including hypersexuality, gambling or money spending. He denies any visual or auditory hallucinations.    He has been seen by an outside Neurologist, and has tried primidone, sinemet QID  and the neupro patch with no improvement. He and his wife say that even shortly after taking his sinemet, he does not see any change in his symptoms. He is most concerned about his tremors and freezing, and says one time he was frozen in one spot for 1 hour.     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 He used to golf but is no longer able to. He does endorse depressed mood, and is taking sertraline. He is currently doing speech therapy for dysphagia.      ROS:   Review of Systems   Eyes: +blurred vision, negative for double vision.   ENT: +dry mouth  Respiratory: Negative for shortness of breath and stridor.   Cardiovascular: Negative for chest pain and palpitations.   Gastrointestinal: Negative for nausea, vomiting. +constipation.   Genitourinary: Negative for frequency. Negative for urgency.   Endo/Heme/Allergies: Does not bruise/bleed easily.   Psychiatric/Behavioral: Negative for memory loss, hallucinations. +depression. The patient is not nervous/anxious.      Medications    Current Outpatient Prescriptions:     donepezil (ARICEPT) 5 MG tablet, Take 5 mg by mouth every evening., Disp: , Rfl:     gabapentin (NEURONTIN) 300 MG capsule, , Disp: , Rfl:     glimepiride (AMARYL) 2 MG tablet, Take 2 mg by mouth 2 (two) times daily., Disp: , Rfl:     lisinopril (PRINIVIL,ZESTRIL) 20 MG tablet, Take 20 mg by mouth once daily., Disp: , Rfl:     metFORMIN (GLUCOPHAGE-XR) 500 MG 24 hr tablet, Take 500 mg by mouth once daily., Disp: , Rfl:     pramipexole (MIRAPEX) 0.25 MG tablet, Take 2 tablets (0.5 mg total) by mouth 3 (three) times daily., Disp: 180 tablet, Rfl: 11    propranolol (INDERAL LA) 60 MG 24 hr capsule, Take 60 mg by mouth once daily., Disp: , Rfl:     pyridoxine, vitamin B6, (VITAMIN B-6) 100 MG Tab, Take 50 mg by mouth once daily., Disp: , Rfl:     sertraline (ZOLOFT) 50 MG tablet, Take 50 mg by mouth once daily., Disp: , Rfl:     simvastatin (ZOCOR) 40 MG tablet,  "Take 40 mg by mouth every evening., Disp: , Rfl:     tamsulosin (FLOMAX) 0.4 mg Cp24, Take 0.4 mg by mouth once daily., Disp: , Rfl:     warfarin (COUMADIN) 5 MG tablet, Take 5 mg by mouth every Mon, Wed, Fri. Sun Tues Thurs Sat....half tablet, Disp: , Rfl:     Physical Exam  /71   Pulse 110   Ht 5' 11" (1.803 m)   Wt 106.7 kg (235 lb 3.7 oz)   BMI 32.81 kg/m²       Constitutional  Well-developed, well-nourished, appears stated age   Neurological    * Mental status      - Orientation  Oriented to person, place, time, and situation     - Other     * Cranial nerves       - CN II  PERRL, visual fields full to confrontation     - CN III, IV, VI  Extraocular movements full, normal pursuits and saccades     - CN V  Sensation V1 - V3 intact     - CN VII  Face strong and symmetric bilaterally. Dysmimia     - CN VIII  Hearing intact bilaterally     - CN IX, X  Palate raises midline and symmetric     - CN XI  SCM and trapezius 5/5 bilaterally     - CN XII  Tongue midline   * Motor  Muscle bulk normal, strength 5/5 throughout. Tone mildly increased in BUE with cogwheel rigidity (R>L)   * Movement  Bradykinetic. Intermittent resting hand tremor (R>L)   * Sensory   Mildly decreased to vibration in bilateral lower extremities at toes.   * Coordination  No dysmetria with finger-to-nose or heel-to-shin   * Gait  Difficulty with initiation - short period of freezing. Then good casual gait with normal stride length, reduced arm swing in right   * Deep tendon reflexes  Not examined       Lab and Test Results    No results found for: WBC, HGB, HCT, PLT  No results found for: GLU, NA, K, CL, CO2, BUN, CREATININE, CALCIUM, MG, PHOS  No results found for: ALB, ALKPHOS, ALT, AST      Images:   MRI brain without contrast (OSH)  Mild midbrain atrophy. Otherwise no acute intracranial abnormalities.    YOBANI Scan (OSH)  Decreased activity bilaterally L>R    Independently reviewed YES    Assessment and Plan    Clint Torres is a 72 " y.o. male who was diagnosed with Parkinson's disease a few years ago, and has tried multiple medications with no benefit (sinemet, primidone, rasagiline, neupro patch, amantadine, pramipexole). Since he has failed so many medications, we decided to not restart any new medications at this time. While he did have a YOBANI scan which confirmed PD, having no response to medications does make us question the diagnosis. We will request the YOBANI scan and MRI from the outside hospital to review it personally. I will see him back in 6 weeks when we can discuss next options.    Problem List Items Addressed This Visit        Neuro    History of stroke    Overview     2012         Current Assessment & Plan     -- may be contributing to, or cause of symptoms  -- will look at outside MRI         PD (Parkinson's disease) - Primary    Overview     2013 right hand tremor, freezing of gait  Tried: sinemet, primidone, rasagiline, neupro patch, amantadine, pramipexole         Current Assessment & Plan     -- recommend taper off pramipexole  -- will get outside YOBANI scan and MRI         Relevant Orders    Ambulatory consult to Physical Therapy       Psychiatric    Depression    Current Assessment & Plan     -- on sertraline            Endocrine    Diabetes mellitus with neurological manifestations    Current Assessment & Plan     -- HbA1c in 2017 of 5.5  -- managed by primary care  -- on metformin                   Bela Blunt MD  Neurology Resident   [facility] [address] [place] [address]  Pager: [phone]

## 2018-08-17 ENCOUNTER — TELEPHONE (OUTPATIENT)
Dept: NEUROLOGY | Facility: CLINIC | Age: 73
End: 2018-08-17

## 2018-09-11 ENCOUNTER — OFFICE VISIT (OUTPATIENT)
Dept: NEUROLOGY | Facility: CLINIC | Age: 73
End: 2018-09-11
Payer: MEDICARE

## 2018-09-11 VITALS
DIASTOLIC BLOOD PRESSURE: 60 MMHG | HEIGHT: 71 IN | WEIGHT: 229.94 LBS | HEART RATE: 56 BPM | BODY MASS INDEX: 32.19 KG/M2 | SYSTOLIC BLOOD PRESSURE: 88 MMHG

## 2018-09-11 DIAGNOSIS — E11.42 TYPE 2 DIABETES MELLITUS WITH DIABETIC POLYNEUROPATHY, WITHOUT LONG-TERM CURRENT USE OF INSULIN: ICD-10-CM

## 2018-09-11 DIAGNOSIS — I95.2 HYPOTENSION DUE TO DRUGS: ICD-10-CM

## 2018-09-11 DIAGNOSIS — G20.A1 PD (PARKINSON'S DISEASE): Primary | ICD-10-CM

## 2018-09-11 PROCEDURE — 99999 PR PBB SHADOW E&M-EST. PATIENT-LVL III: CPT | Mod: PBBFAC,GC,, | Performed by: STUDENT IN AN ORGANIZED HEALTH CARE EDUCATION/TRAINING PROGRAM

## 2018-09-11 PROCEDURE — 99214 OFFICE O/P EST MOD 30 MIN: CPT | Mod: S$PBB,GC,, | Performed by: STUDENT IN AN ORGANIZED HEALTH CARE EDUCATION/TRAINING PROGRAM

## 2018-09-11 PROCEDURE — 99213 OFFICE O/P EST LOW 20 MIN: CPT | Mod: PBBFAC | Performed by: STUDENT IN AN ORGANIZED HEALTH CARE EDUCATION/TRAINING PROGRAM

## 2018-09-11 RX ORDER — CARBIDOPA AND LEVODOPA 25; 100 MG/1; MG/1
3 TABLET ORAL 3 TIMES DAILY
Start: 2018-09-11 | End: 2018-10-11 | Stop reason: SDUPTHER

## 2018-09-11 NOTE — PROGRESS NOTES
Patient Name: Clint Torres  MRN: [id number]    CC: Parkinson's disease    Interval history: Clint Torres is a 72 y.o. male who presents for follow up. He reports his symptoms are stable, and he denies any new issues. He is back on sinemet 2 tablets BID, and no longer on mirapex. He denies any side effects.      Clinic 6/26/18: Since our last visit, he tried pramipexole (up to 0.5mg tid) but noticed no benefit. He admits that his tremor may have improved mildly, but his balance worsened. He says his blood pressure is typically low, but he doesn't know if this changed with the medication. He denies any new symptoms.    Clinic 5/22/18: At his last visit we switched him to amantadine which he did not tolerate due to side effects. We then recommended starting rasagiline which he tried for 2 weeks, but again saw no improvement. He feels confident that no medication has helped his symptoms. He says he now notices a mild tremor in his left hand, otherwise no new issues.    Clinic 2/27/18  At his last visit we decided to rapidly taper off his sinemet to help prove whether this has had an affect on his symptoms. Both he and his wife feel there was a mild worsening of his symptoms after stopping the sinemet, but that it was mostly insignificant. They truly feel that it has had minimal benefit for him. His main complaint continues to be his tremor and gait freezing.    Clinic 11/14/17  HPI: Mr. Torres is a 72 year old male who was diagnosed with Parkinson's disease, who presents to [facility] care. He says that in about 2013, he started to notice a right hand tremor. Since then he has had worsening tremor and associated freezing while walking, slow movements/responses, swallowing difficulty, mild constipation and urinary retention. He also reports intermittent dizziness. He does not sleep well at night due to carpal tunnel, but denies any vivid dreams which his wife agrees with.  He denies any other strange behavior including  hypersexuality, gambling or money spending. He denies any visual or auditory hallucinations.    He has been seen by an outside Neurologist, and has tried primidone, sinemet QID and the neupro patch with no improvement. He and his wife say that even shortly after taking his sinemet, he does not see any change in his symptoms. He is most concerned about his tremors and freezing, and says one time he was frozen in one spot for 1 hour.     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 He used to golf but is no longer able to. He does endorse depressed mood, and is taking sertraline. He is currently doing speech therapy for dysphagia.      ROS:   Review of Systems   Eyes: +blurred vision, negative for double vision.   ENT: +dry mouth  Respiratory: Negative for shortness of breath and stridor.   Cardiovascular: Negative for chest pain and palpitations.   Gastrointestinal: Negative for nausea, vomiting. +constipation.   Genitourinary: Negative for frequency. Negative for urgency.   Endo/Heme/Allergies: Does not bruise/bleed easily.   Psychiatric/Behavioral: Negative for memory loss, hallucinations. +depression. The patient is not nervous/anxious.      Medications    Current Outpatient Medications:     donepezil (ARICEPT) 5 MG tablet, Take 5 mg by mouth every evening., Disp: , Rfl:     gabapentin (NEURONTIN) 300 MG capsule, , Disp: , Rfl:     glimepiride (AMARYL) 2 MG tablet, Take 2 mg by mouth 2 (two) times daily., Disp: , Rfl:     lisinopril (PRINIVIL,ZESTRIL) 20 MG tablet, Take 20 mg by mouth once daily., Disp: , Rfl:     metFORMIN (GLUCOPHAGE-XR) 500 MG 24 hr tablet, Take 500 mg by mouth once daily., Disp: , Rfl:     propranolol (INDERAL LA) 60 MG 24 hr capsule, Take 60 mg by mouth once daily., Disp: , Rfl:     pyridoxine, vitamin B6, (VITAMIN B-6) 100 MG Tab, Take 50 mg by mouth once daily., Disp: , Rfl:     sertraline (ZOLOFT) 50 MG tablet, Take 50 mg by mouth once daily., Disp: ,  "Rfl:     simvastatin (ZOCOR) 40 MG tablet, Take 40 mg by mouth every evening., Disp: , Rfl:     warfarin (COUMADIN) 5 MG tablet, Take 5 mg by mouth every Mon, Wed, Fri. Sun Tues Thurs Sat....half tablet, Disp: , Rfl:     carbidopa-levodopa  mg (SINEMET)  mg per tablet, Take 3 tablets by mouth 3 (three) times daily., Disp: , Rfl:     tamsulosin (FLOMAX) 0.4 mg Cp24, Take 0.4 mg by mouth once daily., Disp: , Rfl:     Physical Exam  BP (!) 88/60   Pulse (!) 56   Ht 5' 11" (1.803 m)   Wt 104.3 kg (229 lb 15 oz)   BMI 32.07 kg/m²       Constitutional  Well-developed, well-nourished, appears stated age   Neurological    * Mental status      - Orientation  Oriented to person, place, time, and situation     - Other     * Cranial nerves       - CN II  PERRL, visual fields full to confrontation     - CN III, IV, VI  Extraocular movements full, normal pursuits and saccades     - CN VII  Face strong and symmetric bilaterally. Dysmimia     - CN IX, X  Palate raises midline and symmetric   * Motor  Muscle bulk normal, strength 5/5 throughout. Tone mildly increased in BUE with cogwheel rigidity (R>L)   * Movement  Bradykinetic. Intermittent resting hand tremor (R>L)   * Sensory   Mildly decreased to vibration in bilateral lower extremities at toes.   * Coordination  No dysmetria with finger-to-nose or heel-to-shin   * Gait  Good casual gait with normal stride length, reduced arm swing in right   * Deep tendon reflexes  Not examined       Lab and Test Results    No results found for: WBC, HGB, HCT, PLT  No results found for: GLU, NA, K, CL, CO2, BUN, CREATININE, CALCIUM, MG, PHOS  No results found for: ALB, ALKPHOS, ALT, AST      Images:   MRI brain without contrast (OSH)  Mild midbrain atrophy. Otherwise no acute intracranial abnormalities.    YOBANI Scan (OSH)  Decreased activity bilaterally L>R    Independently reviewed YES    Assessment and Plan    Clint Torres is a 72 y.o. male who was diagnosed with " Parkinson's disease a few years ago, and has tried multiple medications with no benefit (sinemet, primidone, rasagiline, neupro patch, amantadine, pramipexole). He is back on sinemet 2 tablets twice a day despite not feeling any improvement. We recommended that he increase his sinemet to 3 tablets three times a day, and if this is ineffective we will add entacapone. If he still sees no improvement, we will consider starting benztropine (and discontinue sinemet/entacapone). He is also planning to start PD rehab near home. I will see him back in 6 weeks when we can discuss next options.    Problem List Items Addressed This Visit        Neuro    PD (Parkinson's disease) - Primary    Overview     2013 right hand tremor, freezing of gait  Tried: sinemet, primidone, rasagiline, neupro patch, amantadine, pramipexole         Current Assessment & Plan     -- increase sinemet to 3 tablets TID  -- patient will communicate via [email or web address] - if no improvement, will add entacapone  -- if no improvement with this, will consider changing to benztropine         Relevant Medications    carbidopa-levodopa  mg (SINEMET)  mg per tablet       Cardiac/Vascular    Hypotension due to drugs    Current Assessment & Plan     -- advised them to document home BP and to follow up with primary care  -- may need to d/c antihypertensives            Endocrine    Diabetes mellitus with neurological manifestations    Current Assessment & Plan     -- HbA1c in 2017 of 5.5  -- managed by primary care  -- on metformin                   Bela Blunt MD  Neurology Resident   [facility] [address] [place] [address]  Pager: [phone]

## 2018-09-11 NOTE — PATIENT INSTRUCTIONS
- Increase sinemet to 3 TABLETS THREE TIMES A DAY  - Send a message through My Chart on 9/23/18 about the effects of this dose - does it improve your tremor or walking?

## 2018-09-11 NOTE — PROGRESS NOTES
See resident note.  We saw the patient together, I examined individually, and we discussed the assessment and plan as she documented in her note.    72 y.o. M with parkinsonism (right tremor, head drop, mild freezing of gait) returns in f/u.  He has retested sinemet and sees no response from it, nor s/e.  He is bothered by the tremor and has looked into surgery, but reluctant about proceeding.    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  If still no responses, will discontinue sinemet and comtan and try benztropine.  The benztropine could worsen the cognitive slowing, so he'll need to be watchful of this.

## 2018-09-12 NOTE — ASSESSMENT & PLAN NOTE
-- increase sinemet to 3 tablets TID  -- patient will communicate via MyChart - if no improvement, will add entacapone  -- if no improvement with this, will consider changing to benztropine

## 2018-09-12 NOTE — ASSESSMENT & PLAN NOTE
-- advised them to document home BP and to follow up with primary care  -- may need to d/c antihypertensives

## 2018-10-11 DIAGNOSIS — G20.A1 PD (PARKINSON'S DISEASE): ICD-10-CM

## 2018-10-11 RX ORDER — CARBIDOPA AND LEVODOPA 25; 100 MG/1; MG/1
3 TABLET ORAL 3 TIMES DAILY
Qty: 270 TABLET | Refills: 11 | Status: SHIPPED | OUTPATIENT
Start: 2018-10-11 | End: 2018-12-06 | Stop reason: SDUPTHER

## 2018-11-15 ENCOUNTER — TELEPHONE (OUTPATIENT)
Dept: NEUROLOGY | Facility: CLINIC | Age: 73
End: 2018-11-15

## 2018-12-06 DIAGNOSIS — G20.A1 PD (PARKINSON'S DISEASE): ICD-10-CM

## 2018-12-06 RX ORDER — CARBIDOPA AND LEVODOPA 25; 100 MG/1; MG/1
3 TABLET ORAL 3 TIMES DAILY
Qty: 270 TABLET | Refills: 11 | Status: SHIPPED | OUTPATIENT
Start: 2018-12-06 | End: 2019-01-02

## 2018-12-06 RX ORDER — CARBIDOPA AND LEVODOPA 25; 100 MG/1; MG/1
3 TABLET ORAL 3 TIMES DAILY
Qty: 270 TABLET | Refills: 11 | Status: CANCELLED | OUTPATIENT
Start: 2018-12-06

## 2019-01-02 ENCOUNTER — OFFICE VISIT (OUTPATIENT)
Dept: NEUROLOGY | Facility: CLINIC | Age: 74
End: 2019-01-02
Payer: MEDICARE

## 2019-01-02 VITALS
HEART RATE: 77 BPM | DIASTOLIC BLOOD PRESSURE: 77 MMHG | BODY MASS INDEX: 32.5 KG/M2 | HEIGHT: 71 IN | WEIGHT: 232.13 LBS | SYSTOLIC BLOOD PRESSURE: 110 MMHG

## 2019-01-02 DIAGNOSIS — G20.A1 PD (PARKINSON'S DISEASE): ICD-10-CM

## 2019-01-02 PROCEDURE — 99213 OFFICE O/P EST LOW 20 MIN: CPT | Mod: PBBFAC | Performed by: STUDENT IN AN ORGANIZED HEALTH CARE EDUCATION/TRAINING PROGRAM

## 2019-01-02 PROCEDURE — 99215 PR OFFICE/OUTPT VISIT, EST, LEVL V, 40-54 MIN: ICD-10-PCS | Mod: S$PBB,GC,, | Performed by: STUDENT IN AN ORGANIZED HEALTH CARE EDUCATION/TRAINING PROGRAM

## 2019-01-02 PROCEDURE — 99215 OFFICE O/P EST HI 40 MIN: CPT | Mod: S$PBB,GC,, | Performed by: STUDENT IN AN ORGANIZED HEALTH CARE EDUCATION/TRAINING PROGRAM

## 2019-01-02 PROCEDURE — 99999 PR PBB SHADOW E&M-EST. PATIENT-LVL III: ICD-10-PCS | Mod: PBBFAC,GC,, | Performed by: STUDENT IN AN ORGANIZED HEALTH CARE EDUCATION/TRAINING PROGRAM

## 2019-01-02 PROCEDURE — 99999 PR PBB SHADOW E&M-EST. PATIENT-LVL III: CPT | Mod: PBBFAC,GC,, | Performed by: STUDENT IN AN ORGANIZED HEALTH CARE EDUCATION/TRAINING PROGRAM

## 2019-01-02 RX ORDER — CARBIDOPA AND LEVODOPA 25; 100 MG/1; MG/1
2 TABLET ORAL 3 TIMES DAILY
Qty: 270 TABLET | Refills: 11
Start: 2019-01-02 | End: 2019-04-23

## 2019-01-02 RX ORDER — ENTACAPONE 200 MG/1
200 TABLET ORAL 3 TIMES DAILY
Qty: 90 TABLET | Refills: 3 | Status: SHIPPED | OUTPATIENT
Start: 2019-01-02 | End: 2019-04-23

## 2019-01-02 NOTE — ASSESSMENT & PLAN NOTE
-- decrease sinemet 25-100mg to 2 tablets TID  -- after 1 week start entacapone  -- if no improvement, we will consider changing to benztropine   -- completed [facility] PT with some improvement

## 2019-01-02 NOTE — PROGRESS NOTES
"Patient Name: Clint Torres  MRN: [id number]    CC: Parkinson's disease    Interval history: Clint Torres is a 73 y.o. male who presents for follow up. He's taking sinemet 3 tablets TID, with some improvement. He does report intermittent nausea shortly after taking sinemet. He did PT with the [facility], with some mild improvement in his gait. This morning he says his tremor is better today, but he hasn't taken his morning sinemet. He feels his tremor is typically worse in the morning, but is unsure if he has fluctuations during the day and is unsure of "off" time.    Clinic 9/11/18: He reports his symptoms are stable, and he denies any new issues. He is back on sinemet 2 tablets BID, and no longer on mirapex. He denies any side effects.      Clinic 6/26/18: Since our last visit, he tried pramipexole (up to 0.5mg tid) but noticed no benefit. He admits that his tremor may have improved mildly, but his balance worsened. He says his blood pressure is typically low, but he doesn't know if this changed with the medication. He denies any new symptoms.    Clinic 5/22/18: At his last visit we switched him to amantadine which he did not tolerate due to side effects. We then recommended starting rasagiline which he tried for 2 weeks, but again saw no improvement. He feels confident that no medication has helped his symptoms. He says he now notices a mild tremor in his left hand, otherwise no new issues.    Clinic 2/27/18  At his last visit we decided to rapidly taper off his sinemet to help prove whether this has had an affect on his symptoms. Both he and his wife feel there was a mild worsening of his symptoms after stopping the sinemet, but that it was mostly insignificant. They truly feel that it has had minimal benefit for him. His main complaint continues to be his tremor and gait freezing.    Clinic 11/14/17  HPI: Mr. Torres is a 72 year old male who was diagnosed with Parkinson's disease, who presents to establish " care. He says that in about 2013, he started to notice a right hand tremor. Since then he has had worsening tremor and associated freezing while walking, slow movements/responses, swallowing difficulty, mild constipation and urinary retention. He also reports intermittent dizziness. He does not sleep well at night due to carpal tunnel, but denies any vivid dreams which his wife agrees with.  He denies any other strange behavior including hypersexuality, gambling or money spending. He denies any visual or auditory hallucinations.    He has been seen by an outside Neurologist, and has tried primidone, sinemet QID and the neupro patch with no improvement. He and his wife say that even shortly after taking his sinemet, he does not see any change in his symptoms. He is most concerned about his tremors and freezing, and says one time he was frozen in one spot for 1 hour.     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 He used to golf but is no longer able to. He does endorse depressed mood, and is taking sertraline. He is currently doing speech therapy for dysphagia.      ROS:   Review of Systems   Eyes: +blurred vision, negative for double vision.   ENT: +dry mouth  Respiratory: Negative for shortness of breath and stridor.   Cardiovascular: Negative for chest pain and palpitations.   Gastrointestinal: +nausea, +constipation.   Genitourinary: Negative for frequency. Negative for urgency.   Endo/Heme/Allergies: Does not bruise/bleed easily.   Psychiatric/Behavioral: Negative for memory loss, hallucinations. +depression. The patient is not nervous/anxious.      Medications    Current Outpatient Medications:     carbidopa-levodopa  mg (SINEMET)  mg per tablet, Take 3 tablets by mouth 3 (three) times daily., Disp: 270 tablet, Rfl: 11    donepezil (ARICEPT) 5 MG tablet, Take 5 mg by mouth every evening., Disp: , Rfl:     gabapentin (NEURONTIN) 300 MG capsule, , Disp: , Rfl:      "lisinopril (PRINIVIL,ZESTRIL) 20 MG tablet, Take 20 mg by mouth once daily., Disp: , Rfl:     metFORMIN (GLUCOPHAGE-XR) 500 MG 24 hr tablet, Take 500 mg by mouth once daily., Disp: , Rfl:     propranolol (INDERAL LA) 60 MG 24 hr capsule, Take 60 mg by mouth once daily., Disp: , Rfl:     pyridoxine, vitamin B6, (VITAMIN B-6) 100 MG Tab, Take 50 mg by mouth once daily., Disp: , Rfl:     sertraline (ZOLOFT) 50 MG tablet, Take 50 mg by mouth once daily., Disp: , Rfl:     simvastatin (ZOCOR) 40 MG tablet, Take 40 mg by mouth every evening., Disp: , Rfl:     warfarin (COUMADIN) 5 MG tablet, Take 5 mg by mouth every Mon, Wed, Fri. Sun Tues Thurs Sat....half tablet, Disp: , Rfl:     glimepiride (AMARYL) 2 MG tablet, Take 2 mg by mouth 2 (two) times daily., Disp: , Rfl:     tamsulosin (FLOMAX) 0.4 mg Cp24, Take 0.4 mg by mouth once daily., Disp: , Rfl:     Physical Exam  /77   Pulse 77   Ht 5' 11" (1.803 m)   Wt 105.3 kg (232 lb 2.3 oz)   BMI 32.38 kg/m²       Constitutional  Well-developed, well-nourished, appears stated age   Neurological    * Mental status      - Orientation  Oriented to person, place, time, and situation     - Other     * Cranial nerves       - CN II  PERRL, visual fields full to confrontation     - CN III, IV, VI  Extraocular movements full, normal pursuits and saccades     - CN VII  Face strong and symmetric bilaterally. Dysmimia     - CN IX, X  Palate raises midline and symmetric   * Motor  Muscle bulk normal, strength 5/5 throughout. Tone mildly increased in BUE with cogwheel rigidity (R>L)   * Movement  Bradykinetic bilaterally (only with flicking movement, not 1st/2nd finger tapping). Intermittent resting hand tremor (R>L)   * Sensory   Mildly decreased to vibration in bilateral lower extremities at toes.   * Coordination  No dysmetria with finger-to-nose or heel-to-shin   * Gait  Good casual gait with normal stride length, reduced arm swing in right. Turn en bloc.   * Deep tendon " reflexes  Not examined       Lab and Test Results    No results found for: WBC, HGB, HCT, PLT  No results found for: GLU, NA, K, CL, CO2, BUN, CREATININE, CALCIUM, MG, PHOS  No results found for: ALB, ALKPHOS, ALT, AST      Images:   MRI brain without contrast (OSH)  Mild midbrain atrophy. Otherwise no acute intracranial abnormalities.    YOBANI Scan (OSH)  Decreased activity bilaterally L>R    Independently reviewed YES    Assessment and Plan    Clint Torres is a 73 y.o. male who was diagnosed with Parkinson's disease a few years ago, and has tried multiple medications with no benefit (sinemet, primidone, rasagiline, neupro patch, amantadine, pramipexole). He increased sinemet to 3 tablets three times a day with some improvement. However due to the nausea, we decided to decrease to 2 tablets three times a day. After 1 week, will add entacapone to see if he gets a more prolonged effect. I feel that he has improved with sinemet, though he and his wife are more hesitant.    If he still sees no improvement, we will consider starting benztropine (and discontinue sinemet/entacapone). I will see him back in 3 weeks when we can discuss next options.    Problem List Items Addressed This Visit        Neuro    PD (Parkinson's disease)    Overview     2013 right hand tremor, freezing of gait  Tried: sinemet, primidone, rasagiline, neupro patch, amantadine, pramipexole         Current Assessment & Plan     -- decrease sinemet 25-100mg to 2 tablets TID  -- after 1 week start entacapone  -- if no improvement, we will consider changing to benztropine   -- completed [facility] PT with some improvement         Relevant Medications    carbidopa-levodopa  mg (SINEMET)  mg per tablet    entacapone (COMTAN) 200 mg Tab              Bela Blunt MD  Neurology Resident   [facility] [address] [place] [address]  Pager: [phone]

## 2019-01-22 ENCOUNTER — OFFICE VISIT (OUTPATIENT)
Dept: NEUROLOGY | Facility: CLINIC | Age: 74
End: 2019-01-22
Payer: MEDICARE

## 2019-01-22 VITALS
SYSTOLIC BLOOD PRESSURE: 104 MMHG | HEIGHT: 71 IN | BODY MASS INDEX: 32.37 KG/M2 | DIASTOLIC BLOOD PRESSURE: 71 MMHG | HEART RATE: 96 BPM | WEIGHT: 231.25 LBS

## 2019-01-22 DIAGNOSIS — E11.42 TYPE 2 DIABETES MELLITUS WITH DIABETIC POLYNEUROPATHY, WITHOUT LONG-TERM CURRENT USE OF INSULIN: ICD-10-CM

## 2019-01-22 DIAGNOSIS — G20.A1 PD (PARKINSON'S DISEASE): Primary | ICD-10-CM

## 2019-01-22 DIAGNOSIS — Z86.73 HISTORY OF STROKE: ICD-10-CM

## 2019-01-22 PROCEDURE — 99214 PR OFFICE/OUTPT VISIT, EST, LEVL IV, 30-39 MIN: ICD-10-PCS | Mod: S$PBB,GC,, | Performed by: STUDENT IN AN ORGANIZED HEALTH CARE EDUCATION/TRAINING PROGRAM

## 2019-01-22 PROCEDURE — 99214 OFFICE O/P EST MOD 30 MIN: CPT | Mod: PBBFAC | Performed by: STUDENT IN AN ORGANIZED HEALTH CARE EDUCATION/TRAINING PROGRAM

## 2019-01-22 PROCEDURE — 99999 PR PBB SHADOW E&M-EST. PATIENT-LVL IV: CPT | Mod: PBBFAC,GC,, | Performed by: STUDENT IN AN ORGANIZED HEALTH CARE EDUCATION/TRAINING PROGRAM

## 2019-01-22 PROCEDURE — 99214 OFFICE O/P EST MOD 30 MIN: CPT | Mod: S$PBB,GC,, | Performed by: STUDENT IN AN ORGANIZED HEALTH CARE EDUCATION/TRAINING PROGRAM

## 2019-01-22 PROCEDURE — 99999 PR PBB SHADOW E&M-EST. PATIENT-LVL IV: ICD-10-PCS | Mod: PBBFAC,GC,, | Performed by: STUDENT IN AN ORGANIZED HEALTH CARE EDUCATION/TRAINING PROGRAM

## 2019-01-22 RX ORDER — TOPIRAMATE 50 MG/1
50 TABLET, FILM COATED ORAL DAILY
Qty: 60 TABLET | Refills: 0 | Status: SHIPPED | OUTPATIENT
Start: 2019-01-22 | End: 2019-04-23

## 2019-01-22 NOTE — PROGRESS NOTES
"Patient Name: Clint Torres  MRN: [id number]    CC: Parkinson's disease    Interval history: Clint Torres is a 73 y.o. male who presents for follow up. Sinemet was decreased to 2 tablets TID, and added entacapone. He didn't see much benefit. His wife wonders if they should try the neupro patch again at higher dose (he was on 4mg). He denies hallucinations, no recent falls.    Clinic 1/2/19: He's taking sinemet 3 tablets TID, with some improvement. He does report intermittent nausea shortly after taking sinemet. He did PT with [facility], with some mild improvement in his gait. This morning he says his tremor is better today, but he hasn't taken his morning sinemet. He feels his tremor is typically worse in the morning, but is unsure if he has fluctuations during the day and is unsure of "off" time.    Clinic 9/11/18: He reports his symptoms are stable, and he denies any new issues. He is back on sinemet 2 tablets BID, and no longer on mirapex. He denies any side effects.    Clinic 6/26/18: Since our last visit, he tried pramipexole (up to 0.5mg tid) but noticed no benefit. He admits that his tremor may have improved mildly, but his balance worsened. He says his blood pressure is typically low, but he doesn't know if this changed with the medication. He denies any new symptoms.    Clinic 5/22/18: At his last visit we switched him to amantadine which he did not tolerate due to side effects. We then recommended starting rasagiline which he tried for 2 weeks, but again saw no improvement. He feels confident that no medication has helped his symptoms. He says he now notices a mild tremor in his left hand, otherwise no new issues.    Clinic 2/27/18  At his last visit we decided to rapidly taper off his sinemet to help prove whether this has had an affect on his symptoms. Both he and his wife feel there was a mild worsening of his symptoms after stopping the sinemet, but that it was mostly insignificant. They truly " feel that it has had minimal benefit for him. His main complaint continues to be his tremor and gait freezing.    Clinic 11/14/17  HPI: Mr. Torres is a 72 year old male who was diagnosed with Parkinson's disease, who presents to [facility] care. He says that in about 2013, he started to notice a right hand tremor. Since then he has had worsening tremor and associated freezing while walking, slow movements/responses, swallowing difficulty, mild constipation and urinary retention. He also reports intermittent dizziness. He does not sleep well at night due to carpal tunnel, but denies any vivid dreams which his wife agrees with.  He denies any other strange behavior including hypersexuality, gambling or money spending. He denies any visual or auditory hallucinations.    He has been seen by an outside Neurologist, and has tried primidone, sinemet QID and the neupro patch with no improvement. He and his wife say that even shortly after taking his sinemet, he does not see any change in his symptoms. He is most concerned about his tremors and freezing, and says one time he was frozen in one spot for 1 hour.     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 He used to golf but is no longer able to. He does endorse depressed mood, and is taking sertraline. He is currently doing speech therapy for dysphagia.      ROS:   Review of Systems   Eyes: +blurred vision, negative for double vision.   ENT: +dry mouth  Respiratory: Negative for shortness of breath and stridor.   Cardiovascular: Negative for chest pain and palpitations.   Gastrointestinal: +nausea, +constipation.   Genitourinary: Negative for frequency. Negative for urgency.   Endo/Heme/Allergies: Does not bruise/bleed easily.   Psychiatric/Behavioral: Negative for memory loss, hallucinations. +depression. The patient is not nervous/anxious.      Medications    Current Outpatient Medications:     carbidopa-levodopa  mg (SINEMET)   "mg per tablet, Take 2 tablets by mouth 3 (three) times daily., Disp: 270 tablet, Rfl: 11    donepezil (ARICEPT) 5 MG tablet, Take 5 mg by mouth every evening., Disp: , Rfl:     entacapone (COMTAN) 200 mg Tab, Take 1 tablet (200 mg total) by mouth 3 (three) times daily., Disp: 90 tablet, Rfl: 3    gabapentin (NEURONTIN) 300 MG capsule, , Disp: , Rfl:     glimepiride (AMARYL) 2 MG tablet, Take 2 mg by mouth 2 (two) times daily., Disp: , Rfl:     metFORMIN (GLUCOPHAGE-XR) 500 MG 24 hr tablet, Take 500 mg by mouth once daily., Disp: , Rfl:     propranolol (INDERAL LA) 60 MG 24 hr capsule, Take 60 mg by mouth once daily., Disp: , Rfl:     pyridoxine, vitamin B6, (VITAMIN B-6) 100 MG Tab, Take 50 mg by mouth once daily., Disp: , Rfl:     sertraline (ZOLOFT) 50 MG tablet, Take 50 mg by mouth once daily., Disp: , Rfl:     simvastatin (ZOCOR) 40 MG tablet, Take 40 mg by mouth every evening., Disp: , Rfl:     warfarin (COUMADIN) 5 MG tablet, Take 5 mg by mouth every Mon, Wed, Fri. Sun Tues Thurs Sat....half tablet, Disp: , Rfl:     Physical Exam  /71   Pulse 96   Ht 5' 11" (1.803 m)   Wt 104.9 kg (231 lb 4.2 oz)   BMI 32.25 kg/m²       Constitutional  Well-developed, well-nourished, appears stated age   Neurological    * Mental status      - Orientation  Oriented to person, place, time, and situation     - Other     * Cranial nerves       - CN II  PERRL, visual fields full to confrontation     - CN III, IV, VI  Extraocular movements full, normal pursuits and saccades     - CN VII  Face strong and symmetric bilaterally. Dysmimia     - CN IX, X  Palate raises midline and symmetric   * Motor  Muscle bulk normal, strength 5/5 throughout. Tone mildly increased in BUE with cogwheel rigidity (R>L)   * Movement  - last sinemet ~5hrs ago  Bradykinetic bilaterally. Intermittent resting hand tremor (R>L) worse today   * Sensory   Not tested   * Coordination  Not tested   * Gait  Good casual gait with normal stride " length, reduced arm swing in right. Turn en bloc.   * Deep tendon reflexes  Not examined       Lab and Test Results    No results found for: WBC, HGB, HCT, PLT  No results found for: GLU, NA, K, CL, CO2, BUN, CREATININE, CALCIUM, MG, PHOS  No results found for: ALB, ALKPHOS, ALT, AST      Images:   MRI brain without contrast ([facility])  Mild midbrain atrophy. Otherwise no acute intracranial abnormalities.    YOBANI Scan (OSH)  Decreased activity bilaterally L>R    Independently reviewed YES    Assessment and Plan    Clint Torres is a 73 y.o. male who was diagnosed with Parkinson's disease in 2013, and has tried multiple medications with no benefit (sinemet, primidone, rasagiline, neupro patch, amantadine, pramipexole, entacapone). Unfortunately he still did not appreciate any improvement with entacapone. At this point he has been refractory to multiple medications, and after discussing options he would like to try topiramate.    We also discussed surgical options with [facility] as he is a candidate. I provided him with some patient information, and we will discuss it further at his next appointment. RTC in 6 weeks.    Problem List Items Addressed This Visit        Neuro    History of stroke    Overview     2012         Current Assessment & Plan     -- no known deficits         PD (Parkinson's disease) - Primary    Overview     2013 right hand tremor, freezing of gait  Tried: sinemet, entacapone, primidone, rasagiline, neupro patch, amantadine, pramipexole         Current Assessment & Plan     -- continue sinemet 2 tablets TID and entacapone  -- start topiramate - 25mg daily for 2 weeks, then 50mg daily  -- pt provided with [facility] information            Endocrine    Diabetes mellitus with neurological manifestations    Current Assessment & Plan     -- HbA1c in 2017 of 5.5  -- managed by primary care  -- on metformin                   Bela Blunt MD  Neurology Resident   [facility]  [address]  [place] [address]  Pager: [phone]

## 2019-01-22 NOTE — ASSESSMENT & PLAN NOTE
-- continue sinemet 2 tablets TID and entacapone  -- start topiramate - 25mg daily for 2 weeks, then 50mg daily  -- pt provided with [facility] information

## 2019-04-12 ENCOUNTER — TELEPHONE (OUTPATIENT)
Dept: NEUROLOGY | Facility: CLINIC | Age: 74
End: 2019-04-12

## 2019-04-12 NOTE — TELEPHONE ENCOUNTER
----- Message from James Bennett sent at 4/12/2019 11:59 AM CDT -----  Contact: Clarice ([place]) [phone]  Needs Advice    Reason for call: Clarice called to make sure ,pt's appointment was scheduled correctly        Communication Preference:PHONE    Additional Information:

## 2019-04-23 ENCOUNTER — OFFICE VISIT (OUTPATIENT)
Dept: NEUROLOGY | Facility: CLINIC | Age: 74
End: 2019-04-23
Payer: MEDICARE

## 2019-04-23 VITALS
BODY MASS INDEX: 31.39 KG/M2 | HEART RATE: 76 BPM | DIASTOLIC BLOOD PRESSURE: 86 MMHG | SYSTOLIC BLOOD PRESSURE: 126 MMHG | HEIGHT: 71 IN | WEIGHT: 224.19 LBS

## 2019-04-23 DIAGNOSIS — I48.20 CHRONIC ATRIAL FIBRILLATION: ICD-10-CM

## 2019-04-23 DIAGNOSIS — F32.A DEPRESSION, UNSPECIFIED DEPRESSION TYPE: ICD-10-CM

## 2019-04-23 DIAGNOSIS — R29.898 DROPPED HEAD SYNDROME: ICD-10-CM

## 2019-04-23 DIAGNOSIS — G20.A1 PD (PARKINSON'S DISEASE): Primary | ICD-10-CM

## 2019-04-23 PROCEDURE — 99999 PR PBB SHADOW E&M-EST. PATIENT-LVL III: CPT | Mod: PBBFAC,GC,, | Performed by: STUDENT IN AN ORGANIZED HEALTH CARE EDUCATION/TRAINING PROGRAM

## 2019-04-23 PROCEDURE — 99214 OFFICE O/P EST MOD 30 MIN: CPT | Mod: S$PBB,GC,, | Performed by: STUDENT IN AN ORGANIZED HEALTH CARE EDUCATION/TRAINING PROGRAM

## 2019-04-23 PROCEDURE — 99213 OFFICE O/P EST LOW 20 MIN: CPT | Mod: PBBFAC | Performed by: STUDENT IN AN ORGANIZED HEALTH CARE EDUCATION/TRAINING PROGRAM

## 2019-04-23 PROCEDURE — 99214 PR OFFICE/OUTPT VISIT, EST, LEVL IV, 30-39 MIN: ICD-10-PCS | Mod: S$PBB,GC,, | Performed by: STUDENT IN AN ORGANIZED HEALTH CARE EDUCATION/TRAINING PROGRAM

## 2019-04-23 PROCEDURE — 99999 PR PBB SHADOW E&M-EST. PATIENT-LVL III: ICD-10-PCS | Mod: PBBFAC,GC,, | Performed by: STUDENT IN AN ORGANIZED HEALTH CARE EDUCATION/TRAINING PROGRAM

## 2019-04-23 RX ORDER — MIRTAZAPINE 15 MG/1
15 TABLET, FILM COATED ORAL NIGHTLY
Qty: 90 TABLET | Refills: 2 | Status: SHIPPED | OUTPATIENT
Start: 2019-04-23

## 2019-04-23 NOTE — PROGRESS NOTES
"Patient Name: Clint Torres  MRN: [id number]    CC: Parkinson's disease    Interval history: Clint Torres is a 73 y.o. male who presents for follow up. Topiramate didn't help, he's no longer on it. He has also stopped taking sinemet since he didn't see any improvement. Now his left hand has started to tremor. He's having swallowing difficulty which his wife thinks is worse, but he feels is better. No hallucinations. Has mild depression which he attributes to limitations with PD diagnosis. He's been on aricept for many years, but his wife cannot recall why he was prescribed it.    Clinic 1/22/19: Sinemet was decreased to 2 tablets TID, and added entacapone. He didn't see much benefit. His wife wonders if they should try the neupro patch again at higher dose (he was on 4mg). He denies hallucinations, no recent falls.    Clinic 1/2/19: He's taking sinemet 3 tablets TID, with some improvement. He does report intermittent nausea shortly after taking sinemet. He [facility] PT with [facility], with some mild improvement in his gait. This morning he says his tremor is better today, but he hasn't taken his morning sinemet. He feels his tremor is typically worse in the morning, but is unsure if he has fluctuations during the day and is unsure of "off" time.    Clinic 9/11/18: He reports his symptoms are stable, and he denies any new issues. He is back on sinemet 2 tablets BID, and no longer on mirapex. He denies any side effects.    Clinic 6/26/18: Since our last visit, he tried pramipexole (up to 0.5mg tid) but noticed no benefit. He admits that his tremor may have improved mildly, but his balance worsened. He says his blood pressure is typically low, but he doesn't know if this changed with the medication. He denies any new symptoms.    Clinic 5/22/18: At his last visit we switched him to amantadine which he did not tolerate due to side effects. We then recommended starting rasagiline which he tried for 2 weeks, but again saw " no improvement. He feels confident that no medication has helped his symptoms. He says he now notices a mild tremor in his left hand, otherwise no new issues.    Clinic 2/27/18  At his last visit we decided to rapidly taper off his sinemet to help prove whether this has had an affect on his symptoms. Both he and his wife feel there was a mild worsening of his symptoms after stopping the sinemet, but that it was mostly insignificant. They truly feel that it has had minimal benefit for him. His main complaint continues to be his tremor and gait freezing.    Clinic 11/14/17  HPI: Mr. Torres is a 72 year old male who was diagnosed with Parkinson's disease, who presents to [facility]. He says that in about 2013, he started to notice a right hand tremor. Since then he has had worsening tremor and associated freezing while walking, slow movements/responses, swallowing difficulty, mild constipation and urinary retention. He also reports intermittent dizziness. He does not sleep well at night due to carpal tunnel, but denies any vivid dreams which his wife agrees with.  He denies any other strange behavior including hypersexuality, gambling or money spending. He denies any visual or auditory hallucinations.    He has been seen by an outside Neurologist, and has tried primidone, sinemet QID and the neupro patch with no improvement. He and his wife say that even shortly after taking his sinemet, he does not see any change in his symptoms. He is most concerned about his tremors and freezing, and says one time he was frozen in one spot for 1 hour.     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 He used to golf but is no longer able to. He does endorse depressed mood, and is taking sertraline. He is currently doing speech therapy for dysphagia.      ROS:   Review of Systems   Eyes: +blurred vision, negative for double vision.   ENT: +dry mouth  Respiratory: Negative for shortness of breath and  "stridor.   Cardiovascular: Negative for chest pain and palpitations.   Gastrointestinal: +nausea, +constipation.   Genitourinary: Negative for frequency. Negative for urgency.   Endo/Heme/Allergies: Does not bruise/bleed easily.   Psychiatric/Behavioral: Negative for memory loss, hallucinations. +depression (mild). The patient is not nervous/anxious.      Medications    Current Outpatient Medications:     carbidopa-levodopa  mg (SINEMET)  mg per tablet, Take 2 tablets by mouth 3 (three) times daily., Disp: 270 tablet, Rfl: 11    donepezil (ARICEPT) 5 MG tablet, Take 5 mg by mouth every evening., Disp: , Rfl:     entacapone (COMTAN) 200 mg Tab, Take 1 tablet (200 mg total) by mouth 3 (three) times daily., Disp: 90 tablet, Rfl: 3    gabapentin (NEURONTIN) 300 MG capsule, , Disp: , Rfl:     glimepiride (AMARYL) 2 MG tablet, Take 2 mg by mouth 2 (two) times daily., Disp: , Rfl:     metFORMIN (GLUCOPHAGE-XR) 500 MG 24 hr tablet, Take 500 mg by mouth once daily., Disp: , Rfl:     propranolol (INDERAL LA) 60 MG 24 hr capsule, Take 60 mg by mouth once daily., Disp: , Rfl:     pyridoxine, vitamin B6, (VITAMIN B-6) 100 MG Tab, Take 50 mg by mouth once daily., Disp: , Rfl:     sertraline (ZOLOFT) 50 MG tablet, Take 50 mg by mouth once daily., Disp: , Rfl:     simvastatin (ZOCOR) 40 MG tablet, Take 40 mg by mouth every evening., Disp: , Rfl:     topiramate (TOPAMAX) 50 MG tablet, Take 1 tablet (50 mg total) by mouth once daily., Disp: 60 tablet, Rfl: 0    warfarin (COUMADIN) 5 MG tablet, Take 5 mg by mouth every Mon, Wed, Fri. Sun Tues Thurs Sat....half tablet, Disp: , Rfl:     Physical Exam  /86   Pulse 76   Ht 5' 11" (1.803 m)   Wt 101.7 kg (224 lb 3.3 oz)   BMI 31.27 kg/m²       Constitutional  Well-developed, well-nourished, appears stated age   Neurological    * Mental status      - Orientation  Oriented to person, place, time, and situation     - Other     * Cranial nerves       - CN II  " PERRL     - CN III, IV, VI  Extraocular movements full, normal pursuits and saccades     - CN VII  Face strong and symmetric bilaterally. Dysmimia     - CN IX, X  Palate raises midline and symmetric   * Motor  Muscle bulk normal, strength 5/5 throughout. Tone increased in BUE with cogwheel rigidity (R>L)   * Movement  - off medication  Bradykinetic bilaterally. Intermittent resting hand tremor (R>L)    * Sensory   Not tested   * Coordination  Not tested   * Gait  Good casual gait with normal stride length, reduced arm swing in right. Turn en bloc.   * Deep tendon reflexes  Not examined       Lab and Test Results    No results found for: WBC, HGB, HCT, PLT  No results found for: GLU, NA, K, CL, CO2, BUN, CREATININE, CALCIUM, MG, PHOS  No results found for: ALB, ALKPHOS, ALT, AST      Images:   MRI brain without contrast ([facility])  Mild midbrain atrophy. Otherwise no acute intracranial abnormalities.    YOBANI Scan ([facility])  Decreased activity bilaterally L>R    Independently reviewed YES    Assessment and Plan    Clint Torres is a 73 y.o. male with idiopathic Parkinson's disease, predominantly upper body but with freezing gait. He has tried multiple medications with no subjective benefit (sinemet, primidone, rasagiline, neupro patch, amantadine, pramipexole, entacapone). I feel there was some objective improvement, though mild and not clinically significant to the patient. At this point he is refractory to multiple medications, and is interested in discussing DBS.    We will schedule him with Dr. Crespo for DBS evaluation. He will need neuropsychology testing due to the history of depression and memory impairment, to stratify surgical risk and optimize lead placement.     Problem List Items Addressed This Visit        Neuro    PD (Parkinson's disease) - Primary    Overview     2013 right hand tremor, freezing of gait. Predominantly upper extremities  Failed: sinemet, entacapone, primidone, rasagiline, neupro patch, amantadine,  pramipexole, topiramate         Current Assessment & Plan     -- no longer on any medications  -- scheduled with Dr. Crespo for DBS evaluation  -- Neuropsych referral         Relevant Orders    Ambulatory Referral to Neuropsychology       Psychiatric    Depression    Current Assessment & Plan     -- well controlled on sertraline  -- attributes to decreased QOL from PD            Cardiac/Vascular    Atrial fibrillation    Current Assessment & Plan     -- on coumadin            Orthopedic    Dropped head syndrome              Bela Blunt MD  Neurology Resident   [facility] [address] [place] [address]  Pager: [phone]

## 2019-04-23 NOTE — PROGRESS NOTES
"See resident note.  We saw the patient together, I examined individually, and we discussed the assessment and plan as she documented in her note.    73 y.o. M with recalcitrant right parkinsonian tremor, FOG, imbalance, head drop, mild depression and mild cognitive impairment.  I suspect he does not have iPD, but could still be DBS candidate for the tremor.  Need neuropsych testing and repeat discussion to review benefit/risk.  He is rightfully reluctant if it will "only" treat the tremor.  "

## 2019-04-28 NOTE — ASSESSMENT & PLAN NOTE
-- no longer on any medications  -- scheduled with Dr. Crespo for DBS evaluation  -- Neuropsych referral

## 2019-08-29 ENCOUNTER — TELEPHONE (OUTPATIENT)
Dept: NEUROLOGY | Facility: CLINIC | Age: 74
End: 2019-08-29

## 2019-08-29 ENCOUNTER — INITIAL CONSULT (OUTPATIENT)
Dept: NEUROLOGY | Facility: CLINIC | Age: 74
End: 2019-08-29
Payer: MEDICARE

## 2019-08-29 DIAGNOSIS — F43.23 ADJUSTMENT DISORDER WITH MIXED ANXIETY AND DEPRESSED MOOD: ICD-10-CM

## 2019-08-29 DIAGNOSIS — G47.20 SLEEP-WAKE DISORDER: ICD-10-CM

## 2019-08-29 DIAGNOSIS — F06.70 MILD NEUROCOGNITIVE DISORDER DUE TO PARKINSON'S DISEASE: Primary | ICD-10-CM

## 2019-08-29 DIAGNOSIS — G20.A1 PD (PARKINSON'S DISEASE): ICD-10-CM

## 2019-08-29 DIAGNOSIS — G20.A1 MILD NEUROCOGNITIVE DISORDER DUE TO PARKINSON'S DISEASE: Primary | ICD-10-CM

## 2019-08-29 PROCEDURE — 96133 NRPSYC TST EVAL PHYS/QHP EA: CPT | Mod: ,,, | Performed by: CLINICAL NEUROPSYCHOLOGIST

## 2019-08-29 PROCEDURE — 96139 PSYCL/NRPSYC TST TECH EA: CPT | Mod: ,,, | Performed by: CLINICAL NEUROPSYCHOLOGIST

## 2019-08-29 PROCEDURE — 90791 PSYCH DIAGNOSTIC EVALUATION: CPT | Mod: ,,, | Performed by: CLINICAL NEUROPSYCHOLOGIST

## 2019-08-29 PROCEDURE — 96132 PR NEUROPSYCHOLOGIC TEST EVAL SVCS, 1ST HR: ICD-10-PCS | Mod: ,,, | Performed by: CLINICAL NEUROPSYCHOLOGIST

## 2019-08-29 PROCEDURE — 90791 PR PSYCHIATRIC DIAGNOSTIC EVALUATION: ICD-10-PCS | Mod: ,,, | Performed by: CLINICAL NEUROPSYCHOLOGIST

## 2019-08-29 PROCEDURE — 96139 PR PSYCH/NEUROPSYCH TEST ADMIN/SCORING, BY TECH, 2+ TESTS, EA ADDTL 30 MIN: ICD-10-PCS | Mod: ,,, | Performed by: CLINICAL NEUROPSYCHOLOGIST

## 2019-08-29 PROCEDURE — 96132 NRPSYC TST EVAL PHYS/QHP 1ST: CPT | Mod: ,,, | Performed by: CLINICAL NEUROPSYCHOLOGIST

## 2019-08-29 PROCEDURE — 99499 NO LOS: ICD-10-PCS | Mod: S$PBB,,, | Performed by: CLINICAL NEUROPSYCHOLOGIST

## 2019-08-29 PROCEDURE — 96138 PSYCL/NRPSYC TECH 1ST: CPT | Mod: ,,, | Performed by: CLINICAL NEUROPSYCHOLOGIST

## 2019-08-29 PROCEDURE — 96138 PR PSYCH/NEUROPSYCH TEST ADMIN/SCORING, BY TECH, 2+ TESTS, 1ST 30 MIN: ICD-10-PCS | Mod: ,,, | Performed by: CLINICAL NEUROPSYCHOLOGIST

## 2019-08-29 PROCEDURE — 96133 PR NEUROPSYCHOLOGIC TEST EVAL SVCS, EA ADDTL HR: ICD-10-PCS | Mod: ,,, | Performed by: CLINICAL NEUROPSYCHOLOGIST

## 2019-08-29 PROCEDURE — 99499 UNLISTED E&M SERVICE: CPT | Mod: S$PBB,,, | Performed by: CLINICAL NEUROPSYCHOLOGIST

## 2019-08-29 NOTE — PROGRESS NOTES
Outpatient Neuropsychological Evaluation and Pre-DBS Evaluation     Referral Information  Name: Clint Torres  MRN: [id number]  : 1945  Age: 73 y.o.  Handedness: Right  Race: White  Gender: male  Referring Provider: Bela Mirza Md  [address] [place] [address]  Referral Reason/Medical Necessity: Cognitive difficulties with neuropsychological evaluation ordered by Neurology to characterize cognitive status, differential diagnosis, and updated treatment recommendations. This evaluation was also to assess surgical candidacy prior to possible DBS.   Billing: See at the end of the report as billing and coding has changed in 2019.  Consent: The patient expressed an understanding of the purpose of the evaluation and consented to all procedures.    HPI/CURRENT SYMPTOMS   Active problems noted below.  [Onset/Course of Cognitive Symptoms]:  Mr. Torres and his wife report some cognitive slowing with onset 2-3 years ago and a definite worsening over time. Otherwise, no major cognitive sxs are reported.     Neuropsychiatric Sxs:  Mood:   Depression Anxiety Other   · Some sadness and irritability 2/2 PD sxs and other prior health pxs.  · Some anxiety 2/2 health issues · None     Neurovegetative:   Sleep Appetite Energy    Sleep-Wake Cycle Disruption in the past few months. He will fall asleep at 5-6am and sleep until 1-2pm. Some napping during the day/evening.   Adequate, but lower than baseline  Adequate     Behavioral Concerns: None  Delusions/Hallucinations: None  SI/HI: None    Physical Sxs:  Motor Sensory Pain    Review Movement Disorder Clinic notes  No major issues  Neuropathy treated with gabapentin       Current Functioning (I/ADLs):  · ADLs:  Needs assistance  · IADLs: Largely dependent, but dependence is multifactorial including his cognitive trouble and, particularly, motor slowing.   · Finances: Wife manages  · Medication Mgmt: Wife sets up  · Driving: Wife drives mostly for the past  "6-12months  · Household Mgmt: Wife handles    PERTINENT BACKGROUND INFORMATION   Family Neurologic History: Essential Tremor  Family Psychiatric History: Negative for heritable risk factors    Developmental/Academic Hx:  Developmental: No gestational or later developmental concerns.  Academic:  · Learning Difficulties: None  · Attention/Behavioral Difficulties: None  · Educational Attainment: HS + AA in General Business ([employer])    Social/Occupational Hx:  Social:  · Current Relationship/Family Status:  first for 16 years and  (2-adult children) +  for 35 years (Clarice) and 1 child together.   · Primary Source of Support: Wife  · Current Hobbies: Sports (TV), Patio, Dog  · Stressors:  · Son passed away 3-4 years. 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    Pertinent Neurologic History  TBIs  None   Seizures  None   CVAs  None   Movement Dis.  Parkinson's Disease   Per Dr. Crespo, "recalcitrant right parkinsonian tremor, FOG, imbalance, head drop, mild depression and " "mild cognitive impairment.  I suspect he does not have iPD, but could still be DBS candidate for the tremor.  Need neuropsych testing and repeat discussion to review benefit/risk.  He is rightfully reluctant if it will "only" treat the tremor."    Other  None         Pertinent Labs Impacting Cognition  Lab Results   Component Value Date    [id number] 294 12/12/2017     No results found for: RPR  No results found for: FOLATE  Lab Results   Component Value Date    TSH 1.923 12/12/2017     Lab Results   Component Value Date    HGBA1C 5.5 12/12/2017     No results found for: HIV1X2, [id number]    Neurodiagnostics  Year Diagnostic Results[Copied from Report]   2014   MRI   TECHNIQUE: MR BRAIN W WO CONTRAST    FINDINGS: The ventricles are normal in size and the sulci are mildly prominent consistent with mild volume loss.  Remote lacunar infarcts in the genu of the right internal capsule into the adjacent thalamus.  Scattered mild white matter changes likely due to chronic small vessel ischemic disease although nonspecific.  No associated enhancement or mass effect.  No metastatic lesions are seen.  Small cortical/subcortical abnormality right occipital parietal junction is likely an old infarct.  No restricted diffusion is seen. No hemorrhage is seen. No abnormal enhancement seen.    There is absence of the normal flow void in the right petrous and cavernous internal carotid artery which is likely occluded however slow flow is not excluded on this type of exam.  Correlate with vascular imaging if clinically indicated.    Mild fluid or inflammatory signal changes in the bilateral mastoids.  There is no Chiari malformation seen.   The pituitary is normal in size.   Paranasal sinuses are essentially clear.         Current Outpatient Medications:     donepezil (ARICEPT) 5 MG tablet, Take 5 mg by mouth every evening., Disp: , Rfl:     gabapentin (NEURONTIN) 300 MG capsule, , Disp: , Rfl:     glimepiride (AMARYL) 2 MG " "tablet, Take 2 mg by mouth 2 (two) times daily., Disp: , Rfl:     metFORMIN (GLUCOPHAGE-XR) 500 MG 24 hr tablet, Take 500 mg by mouth once daily., Disp: , Rfl:     mirtazapine (REMERON) 15 MG tablet, Take 1 tablet (15 mg total) by mouth every evening., Disp: 90 tablet, Rfl: 2    propranolol (INDERAL LA) 60 MG 24 hr capsule, Take 60 mg by mouth once daily., Disp: , Rfl:     pyridoxine, vitamin B6, (VITAMIN B-6) 100 MG Tab, Take 50 mg by mouth once daily., Disp: , Rfl:     sertraline (ZOLOFT) 50 MG tablet, Take 50 mg by mouth once daily., Disp: , Rfl:     simvastatin (ZOCOR) 40 MG tablet, Take 40 mg by mouth every evening., Disp: , Rfl:     warfarin (COUMADIN) 5 MG tablet, Take 5 mg by mouth every Mon, Wed, Fri. Sun Tues Thurs Sat....half tablet, Disp: , Rfl:     Relevant Psychiatric History:  · Childhood: None  · Adult: None  · Substance Use: Couple of glasses of wine per night;     Social History     Tobacco Use    Smoking status: Never Smoker    Smokeless tobacco: Never Used   Substance and Sexual Activity    Alcohol use: Yes     Alcohol/week: 0.6 oz     Types: 1 Cans of beer per week    Drug use: Not on file    Sexual activity: Not on file       MENTAL STATUS AND OBSERVATIONS:     APPEARANCE: Casually dressed and adequate grooming/hygiene.   ALERTNESS/ORIENTATION: Attentive and alert. Fully oriented (x5) to time and place  GAIT: Very slow, Freezing of Gait, Slow start. When he starts, then his gait is more fluid.   MOTOR MOVEMENTS/MANNERISMS: Significant right resting tremor and more severe with action  SPEECH/LANGUAGE:Very slow rate of speech. Lower in volume. Minimal spontaneous communication.  Normal in rate, rhythm, tone, and volume. No significant word finding difficulty noted. Expressive and receptive language was normal.  STATED MOOD/AFFECT: The patients stated mood was "depressed." Affect was quite restricted and evident masked facies. Eye contact variable.   INTERPERSONAL BEHAVIOR: Rapport " was quickly and easily established   SUICIDALITY/HOMICIDALITY: Denied  HALLUCINATIONS/DELUSIONS: None evidenced or endorsed  THOUGHT PROCESSES: Thoughts seemed logical and goal-directed.   TEST TAKING BEHAVIOR and VALIDITY: Embedded performance validity measures and observation of effort were suggestive of adequate engagement. The current results, therefore, are likely a valid reflection of the patient's current functioning.         PROCEDURES/TESTS ADMINISTERED   In addition to performing a review of pertinent medical records, reviewing limits to confidentiality, conducting a clinical interview, and explaining procedures, the following measures were administered: MoCA, WRAT-4 (Reading, Green Version), WAIS-IV (Digit Span, Similarities), ANGELA, Trails A (Deneen), JoLO, and NAB Naming. Note: Other measures were planned, but patient's very slow mental speed and fatigue limited further testing. Manual norms were used unless otherwise indicated.  Review data Appendix Below for scores and percentiles.     TEST RESULTS     Pre-Morbid Functioning    Average range and consistent with background   Mental Status:   · MoCA=23/30 with 6/6 for orientation and 4/6 for recall. Main issue was executive dysfunction (failed Trails B) and struggled with construction tasks 2/2 tremor   Attention/Working Memory:  Normal    Processing or Mental Speed  Extremely slow both on testing and observaitonally   Language    Basic expressive and receptive language was normal on observation   Speech was very slow and minimally produced due to slow speed. When produced, speech rate was slow with a low voice but otherwise normal   Visuospatial/  Construction:  Significant trouble judging line orientations on JoLO   Could not complete other construction tasks 2/2 tremor   Learning and Memory:    Adequate recall on MoCA   Executive or Frontal-lobe Functions  Difficult to assess due to motor trouble and very slow processing speed. But, verbal  reasoning was normal.   Psychiatric or Behavioral Symptoms  Mild adjustment-related depression/anxiety 2/2 PD sxs   Sleep-wake cycle disruption       OVERALL SUMMARY/DIAGNOSTIC IMPRESSIONS:   Review above for onset/course of cognitive symptoms along with functional status and pertinent medical history.     Referral Dx:  Memory Loss  G20 (ICD-10-CM) - PD (Parkinson's disease)    Consult Dx:  Problem List Items Addressed This Visit        Neuro    PD (Parkinson's disease)    Overview     2013 right hand tremor, freezing of gait. 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 Additionally, his attention/working memory and verbal reasoning (ability to solve problems talking through them without speed requirements) was normal. Language skills were also normal.   -Diagnostically, he is on the borderzone between Mild Cognitive Impairment and Dementia, but his functional impairment is likely more due to motor sxs 2/2 PD than cognitive impairment at this time. So, will keep MCI dx.   -No concerns about capacity and no cognitive/behavioral contraindications prior to DBS.   -Importantly, recommend very slow discussion of DBS and asking him to restate/summarize to ensure adequate comprehension given his severely slow processing speed.   -See other recs below in report            Psychiatric    Adjustment disorder with mixed anxiety and depressed mood    Current Assessment & Plan     -Stable, but still problematic  -Likely improvement should PD sxs improve with DBS or some other tx              Other    Sleep-wake disorder              VIJAY Mckeon, II, Ph.D.,  ABPP-CN  Board Certified Clinical Neuropsychologist  Co-Director, Cognitive Disorders and Brain Health Program  Department of Neurology and Neurosciences  [facility]    RECOMMENDATIONS/TREATMENT PLAN     Follow Up Recommendations:  · Neurology Follow-up: Continued Neurology follow-up as recommended by Mr. Hercules neurologist.  · Driving Evaluation: Based on these findings it is recommended that Mr. Torres completely discontinue driving until undergoing a formal, on-the-road driving evaluation. This evaluation can be completed at the [facility] with Jatinder Alan. If interested, I can place a referral and the family can contact Katrina Washington at [phone] for scheduling.    · Neuropsychology: Neuropsychological reevaluation is recommended in 12-months following implementation of recommendations.  · Sleep: Will review below recommendations on sleep with wife. If they cannot implement, then recommend wife discuss med rec with Dr. Crespo.     Recommendations for Mr. Torres and Caregivers/Family:   · Brain Health: Will discuss improving vascular health and brain health. This includes recommendations for physical activity, healthy diet (e.g., Mediterranean Style Diet), social activity, and mental activity.   · Sleep Hygiene: Poor sleep has a negative effect on cognition. Several strategies have been shown to improve sleep:   · Caffeine intake in the afternoon and evening, as well as stuffing oneself at supper, can decrease the quality of restful sleep throughout the night.   · Bedtime and wake-up times should be consistent every night and morning so the body becomes used to a single routine, even on the weekends.  · Engage in daily physical activity, but not 2-3 hours before bedtime.   · No technology use (television, computer, iPad) 1-2 hours before bed.   · Have a wind down routine (e.g., soft lights in the house, bath before bed, reduced fluid intake, songs, reading, less noise) to promote sleep  readiness.   · Visit the www.sleepfoundation.org for more strategies.   · Practice good cognitive health:  · Engage in regular exercise, which increases alertness and arousal and can improve attention and focus.  Consider lower impact exercises, such as yoga or light walking.  · Get a good nights sleep, as this can enhance alertness and cognition.  · Eat healthy foods and balanced meals. It is notable that research indicates certain nutrients may aid in brain function, such as B vitamins (especially B6, B12, and folic acid), antioxidants (such as vitamins C and E, and beta carotene), and Omega-3 fatty acids. Talk with your physician or nutritionist about whats right for you.   · Keep your brain active. Find activities to stay mentally active, such as reading, games (cards, checkers), puzzles (crosswords, Sudoku, jig saw), crafts (models, woodworking), gardening, or participating in activities in the community.  · Stay socially engaged. Continue staying active with your family and friends.    BILLING     Service Description CPT Code Minutes Units   Psychiatric diagnostic evaluation by physician [id number] [phone]   Neurobehavioral status exam by physician [id number]  0   Each additional hour by physician [id number]  0   Test Evaluation Services --  --   Neuropsychological testing evaluation services by physician [id number] [phone]   Each additional hour by physician [id number] [phone]   Test Administration and Scoring --  --   Psychological or neuropsychological test administration and scoring by physician [id number]  0   Each additional 30 minutes by physician [id number]  0   Psychological or neuropsychological test administration and scoring by technician [id number] 1   Each additional 30 minutes by technician [id number] [phone]         DATA APPENDIX:   Note: It is important to note that scores/percentiles should only be interpreted by a neuropsychologist. It is common for healthy individuals to have 1-3 isolated low/unusual scores that are not indicative of any  significant cognitive dysfunction.     PERFORMANCE VALIDITY  RDS...................................8 / Valid      Percentile Interpretation:        </=3rd......................................Abnormal        4th-9th.....................................Borderline Abnormal        10th-24th...................................Low Average        25th-74th...................................Average        75th-90th...................................High Average        91st-97th...................................Superior        >/=97th.....................................Very Superior        SCREENING OF GENERAL COGNITIVE FUNCTIONING:    ANGELA (total score/descriptor):........13 / Impaired    MOCA (total score/description):......23 / Impaired  Orientation = 6/6, Recall = 4/5  Most trouble with spatial/construction tasks 2/2 tremor       ESTIMATED FSIQ and READING LEVEL:      WRAT-4 (Raw/SS/%ile)..................60 /  101 / 53rd      AUDITORY ATTENTION AND WORKING MEMORY    [id number] Span        Forward raw.........................10 / 50th      Forward span.........................7 /       Backward raw.........................7 / 37th      Backward span........................4 /       Sequencing raw.......................6 / 37th      Sequencing span......................4 /       Overall (SS/percentile)..............9 / 37th            MOTOR AND ORAL PROCESSING SPEED     Trail Making Test (sec. to completion/percentile):        Part A .....................................69 / <1st          Errors..................................0 /           CONFRONTATION NAMING    NAB Naming Test (raw/percentile)        Total Spontaneous (max. = 31)...............31/ 79th      VERBAL REASONING    WAIS-IV (scaled score/percentile):        Similarities................................10 / 50th      VISUOPERCEPTUAL/SPATIAL REASONING    Brief-15 NOMAN (raw score/percentile)        Form V (max. = 15)...........................9 / Well below  expectations

## 2019-09-03 PROBLEM — F06.70 MILD NEUROCOGNITIVE DISORDER DUE TO PARKINSON'S DISEASE: Status: ACTIVE | Noted: 2019-09-03

## 2019-09-03 PROBLEM — G20.A1 MILD NEUROCOGNITIVE DISORDER DUE TO PARKINSON'S DISEASE: Status: ACTIVE | Noted: 2019-09-03

## 2019-09-03 PROBLEM — G47.20 SLEEP-WAKE DISORDER: Status: ACTIVE | Noted: 2019-09-03

## 2019-09-03 PROBLEM — F43.23 ADJUSTMENT DISORDER WITH MIXED ANXIETY AND DEPRESSED MOOD: Status: ACTIVE | Noted: 2017-11-15

## 2019-09-03 NOTE — ASSESSMENT & PLAN NOTE
-Neuropsych testing showed extremely slow processing speed, but normal orientation to time and recall/memory. Additionally, his attention/working memory and verbal reasoning (ability to solve problems talking through them without speed requirements) was normal. Language skills were also normal.   -Diagnostically, he is on the borderzone between Mild Cognitive Impairment and Dementia, but his functional impairment is likely more due to motor sxs 2/2 PD than cognitive impairment at this time. So, will keep MCI dx.   -No concerns about capacity and no cognitive/behavioral contraindications prior to DBS.   -Importantly, recommend very slow discussion of DBS and asking him to restate/summarize to ensure adequate comprehension given his severely slow processing speed.   -See other recs below in report

## 2019-09-03 NOTE — ASSESSMENT & PLAN NOTE
-Stable, but still problematic  -Likely improvement should PD sxs improve with DBS or some other tx

## 2019-09-17 ENCOUNTER — OFFICE VISIT (OUTPATIENT)
Dept: NEUROLOGY | Facility: CLINIC | Age: 74
End: 2019-09-17
Payer: MEDICARE

## 2019-09-17 DIAGNOSIS — G20.A1 MILD NEUROCOGNITIVE DISORDER DUE TO PARKINSON'S DISEASE: ICD-10-CM

## 2019-09-17 DIAGNOSIS — F43.23 ADJUSTMENT DISORDER WITH MIXED ANXIETY AND DEPRESSED MOOD: ICD-10-CM

## 2019-09-17 DIAGNOSIS — G20.A1 PD (PARKINSON'S DISEASE): Primary | ICD-10-CM

## 2019-09-17 DIAGNOSIS — F06.70 MILD NEUROCOGNITIVE DISORDER DUE TO PARKINSON'S DISEASE: ICD-10-CM

## 2019-09-17 PROCEDURE — 99499 UNLISTED E&M SERVICE: CPT | Mod: S$PBB,,, | Performed by: CLINICAL NEUROPSYCHOLOGIST

## 2019-09-17 PROCEDURE — 99499 NO LOS: ICD-10-PCS | Mod: S$PBB,,, | Performed by: CLINICAL NEUROPSYCHOLOGIST

## 2019-09-17 PROCEDURE — 99999 PR PBB SHADOW E&M-EST. PATIENT-LVL I: CPT | Mod: PBBFAC,,, | Performed by: CLINICAL NEUROPSYCHOLOGIST

## 2019-09-17 PROCEDURE — 99999 PR PBB SHADOW E&M-EST. PATIENT-LVL I: ICD-10-PCS | Mod: PBBFAC,,, | Performed by: CLINICAL NEUROPSYCHOLOGIST

## 2019-09-17 PROCEDURE — 99211 OFF/OP EST MAY X REQ PHY/QHP: CPT | Mod: PBBFAC | Performed by: CLINICAL NEUROPSYCHOLOGIST

## 2019-09-17 NOTE — PATIENT INSTRUCTIONS
· Neurology Follow-up: Continued Neurology follow-up as recommended by Mr. Hercules neurologist.  · Driving Evaluation: Based on these findings it is recommended that Mr. Torres completely discontinue driving until undergoing a formal, on-the-road driving evaluation. This evaluation [facility] be completed at the [facility] with Jatinder Alan. If interested, I can place a referral and the family can contact Katrinachrissy Chen at [phone] for scheduling.    · Neuropsychology: Neuropsychological reevaluation is recommended in 12-months following implementation of recommendations.  · Sleep: Will review below recommendations on sleep with wife. If they cannot implement, then recommend wife discuss med rec with Dr. Crespo.      Recommendations for Mr. Torres and Caregivers/Family:   · Brain Health: Will discuss improving vascular health and brain health. This includes recommendations for physical activity, healthy diet (e.g., Mediterranean Style Diet), social activity, and mental activity.   · Sleep Hygiene: Poor sleep has a negative effect on cognition. Several strategies have been shown to improve sleep:   · Caffeine intake in the afternoon and evening, as well as stuffing oneself at supper, can decrease the quality of restful sleep throughout the night.   · Bedtime and wake-up times should be consistent every night and morning so the body becomes used to a single routine, even on the weekends.  · Engage in daily physical activity, but not 2-3 hours before bedtime.   · No technology use (television, computer, iPad) 1-2 hours before bed.   · Have a wind down routine (e.g., soft lights in the house, bath before bed, reduced fluid intake, songs, reading, less noise) to promote sleep readiness.   · Visit the www.sleepfoundation.org for more strategies.   · Practice good cognitive health:  · Engage in regular exercise, which increases alertness and arousal and can improve attention and focus.  Consider lower impact  exercises, such as yoga or light walking.  · Get a good nights sleep, as this can enhance alertness and cognition.  · Eat healthy foods and balanced meals. It is notable that research indicates certain nutrients may aid in brain function, such as B vitamins (especially B6, B12, and folic acid), antioxidants (such as vitamins C and E, and beta carotene), and Omega-3 fatty acids. Talk with your physician or nutritionist about whats right for you.   · Keep your brain active. Find activities to stay mentally active, such as reading, games (cards, checkers), puzzles (crosswords, Sudoku, jig saw), crafts (models, woodworking), gardening, or participating in activities in the community.  · Stay socially engaged. Continue staying active with your family and friends.

## 2019-09-18 ENCOUNTER — TELEPHONE (OUTPATIENT)
Dept: NEUROLOGY | Facility: CLINIC | Age: 74
End: 2019-09-18

## 2019-09-18 NOTE — TELEPHONE ENCOUNTER
----- Message from VIJAY Mckeon II, PhD sent at 9/17/2019  2:26 PM CDT -----  Christine kramer    This patient wants a follow up to schedule dbs eval.    Thanks    Héctor

## 2019-09-18 NOTE — PROGRESS NOTES
Neuropsychology Feedback Note    Date of Session: 09/18/2019  Session Content: Results and recommendations were discussed for 40-minutes. Review Neuropsychology Consult dated 8.29/19 for details. No further neuropsychology feedback needed.

## 2019-09-25 ENCOUNTER — PATIENT MESSAGE (OUTPATIENT)
Dept: NEUROLOGY | Facility: CLINIC | Age: 74
End: 2019-09-25

## 2020-01-06 ENCOUNTER — TELEPHONE (OUTPATIENT)
Dept: NEUROLOGY | Facility: CLINIC | Age: 75
End: 2020-01-06

## 2020-01-06 NOTE — TELEPHONE ENCOUNTER
----- Message from Connie Hurtado sent at 1/6/2020  9:59 AM CST -----  Contact: Clarice Torres (Spouse)  Pt's spouse called to reschedule appt. [phone]

## 2020-01-16 ENCOUNTER — OFFICE VISIT (OUTPATIENT)
Dept: NEUROLOGY | Facility: CLINIC | Age: 75
End: 2020-01-16
Payer: MEDICARE

## 2020-01-16 VITALS
HEART RATE: 101 BPM | BODY MASS INDEX: 31.27 KG/M2 | SYSTOLIC BLOOD PRESSURE: 117 MMHG | DIASTOLIC BLOOD PRESSURE: 79 MMHG | HEIGHT: 71 IN

## 2020-01-16 DIAGNOSIS — R13.10 DYSPHAGIA, UNSPECIFIED TYPE: Primary | ICD-10-CM

## 2020-01-16 DIAGNOSIS — G20.C PARKINSONISM, UNSPECIFIED PARKINSONISM TYPE: ICD-10-CM

## 2020-01-16 PROCEDURE — 99999 PR PBB SHADOW E&M-EST. PATIENT-LVL III: ICD-10-PCS | Mod: PBBFAC,,, | Performed by: PSYCHIATRY & NEUROLOGY

## 2020-01-16 PROCEDURE — 1159F MED LIST DOCD IN RCRD: CPT | Mod: ,,, | Performed by: PSYCHIATRY & NEUROLOGY

## 2020-01-16 PROCEDURE — 99213 OFFICE O/P EST LOW 20 MIN: CPT | Mod: PBBFAC | Performed by: PSYCHIATRY & NEUROLOGY

## 2020-01-16 PROCEDURE — 99214 PR OFFICE/OUTPT VISIT, EST, LEVL IV, 30-39 MIN: ICD-10-PCS | Mod: S$PBB,,, | Performed by: PSYCHIATRY & NEUROLOGY

## 2020-01-16 PROCEDURE — 1159F PR MEDICATION LIST DOCUMENTED IN MEDICAL RECORD: ICD-10-PCS | Mod: ,,, | Performed by: PSYCHIATRY & NEUROLOGY

## 2020-01-16 PROCEDURE — 1126F PR PAIN SEVERITY QUANTIFIED, NO PAIN PRESENT: ICD-10-PCS | Mod: ,,, | Performed by: PSYCHIATRY & NEUROLOGY

## 2020-01-16 PROCEDURE — 1126F AMNT PAIN NOTED NONE PRSNT: CPT | Mod: ,,, | Performed by: PSYCHIATRY & NEUROLOGY

## 2020-01-16 PROCEDURE — 99214 OFFICE O/P EST MOD 30 MIN: CPT | Mod: S$PBB,,, | Performed by: PSYCHIATRY & NEUROLOGY

## 2020-01-16 PROCEDURE — 99999 PR PBB SHADOW E&M-EST. PATIENT-LVL III: CPT | Mod: PBBFAC,,, | Performed by: PSYCHIATRY & NEUROLOGY

## 2020-01-16 RX ORDER — MIRTAZAPINE 15 MG/1
15 TABLET, FILM COATED ORAL NIGHTLY
COMMUNITY

## 2020-01-16 RX ORDER — DOCUSATE SODIUM 100 MG/1
100 CAPSULE, LIQUID FILLED ORAL 2 TIMES DAILY
COMMUNITY

## 2020-01-16 RX ORDER — ATROPINE SULFATE 10 MG/ML
SOLUTION/ DROPS OPHTHALMIC
Qty: 1 BOTTLE | Refills: 5 | Status: SHIPPED | OUTPATIENT
Start: 2020-01-16

## 2020-01-16 NOTE — ASSESSMENT & PLAN NOTE
Longstanding PDism, with therapy resistant tremor.  Concerns for DBS include coumadin use, afib, memory decline with severely low processing speed, and FOG (depending on target). Will try Rytary 95mg up to 4 tabs TID as he has failed other therapies. As he focuses on his tremor, as he has gone without any PD therapy, I worry he will fall from worsening rigidity and FOG.. Suggested PT and walker. Will discuss with DBS team.  Atropine drops for sialorrhea 2 drops in mouth.

## 2020-01-16 NOTE — PROGRESS NOTES
MOVEMENT DISORDERS CLINIC NEW CONSULT NOTE    PCP/Referring Provider: No referring provider defined for this encounter.  Date of Service: 1/16/2020    Chief Complaint: Tremor    HPI: Clint Torres is a R HANDED 74 y.o. male with a medical issues significant for Afib on Coumadin, CVA, MCI, throat cancer s/p radiation, with parkinsonism, coming for DBS eval. Daughter noted resting tremor of R hand 5 years ago which has slowly increased over time. At this point he cannot eat or write due to severity of tremor. R hand remains worse than L however he is R handed. His gait slowed soon after. He does not fall often. Does not use a walker or a cane. Can walk 1 block. FOG started 2 years ago. Feet stick to floor and causes imbalance.  Neuro psych eval has noticed MCI with extremely slow processing speed.    For tremor has tried  Sinemet (up to 3 tabs at a time) , primidone, rasagiline, neupro patch, amantadine, pramipexole, entacapone  Nothing has significantly helped - he stopped all therapies since it did not help tremor    Tried PT and [employer]    Neuroleptic exposure:  None    PD Review of Symptoms:  Anosmia: yes  Dysarthria/Hypophonia: moderate  Dysphagia/Sialorrhea: Yes,   Depression: zoloft  Cognitive slowing: As above  Hallucinations: none  Impulsivity: none  Urinary changes: retension  Constipation: yes  Orthostasis: at times  Dyskinesia: none  Falls: yes  Freezing: yes  Micrographia: yes  Sleep issues:  -RBD: None    Review of Systems:   Review of Systems   Constitutional: Negative for fever.   HENT: Negative for congestion.    Eyes: Negative for double vision.   Respiratory: Negative for cough and shortness of breath.    Cardiovascular: Negative for chest pain and leg swelling.   Gastrointestinal: Negative for nausea.   Genitourinary: Negative for dysuria.   Musculoskeletal: Positive for falls.   Skin: Negative for rash.   Neurological: Positive for tremors and speech change. Negative for headaches.    Psychiatric/Behavioral: Positive for memory loss. Negative for depression.         Current Medications:  Outpatient Encounter Medications as of 1/16/2020   Medication Sig Dispense Refill    docusate sodium (COLACE) 100 MG capsule Take 100 mg by mouth 2 (two) times daily.      gabapentin (NEURONTIN) 300 MG capsule       mirtazapine (REMERON) 15 MG tablet Take 15 mg by mouth every evening.      sertraline (ZOLOFT) 50 MG tablet Take 50 mg by mouth once daily.      warfarin (COUMADIN) 5 MG tablet Take 5 mg by mouth every Mon, Wed, Fri. Sun Tues Thurs Sat....half tablet      atropine 1% (ISOPTO ATROPINE) 1 % Drop Put 2 drops on tongue for sialorrhea 1 Bottle 5    carbidopa-levodopa (RYTARY) 23.75-95 mg CpSR Take 4 capsules by mouth 3 (three) times daily. 270 capsule 9    donepezil (ARICEPT) 5 MG tablet Take 5 mg by mouth every evening.      glimepiride (AMARYL) 2 MG tablet Take 2 mg by mouth 2 (two) times daily.      metFORMIN (GLUCOPHAGE-XR) 500 MG 24 hr tablet Take 500 mg by mouth once daily.      mirtazapine (REMERON) 15 MG tablet Take 1 tablet (15 mg total) by mouth every evening. 90 tablet 2    propranolol (INDERAL LA) 60 MG 24 hr capsule Take 60 mg by mouth once daily.      pyridoxine, vitamin B6, (VITAMIN B-6) 100 MG Tab Take 50 mg by mouth once daily.      simvastatin (ZOCOR) 40 MG tablet Take 40 mg by mouth every evening.       No facility-administered encounter medications on file as of 1/16/2020.        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 No pertinent surgical history.    Current Living  "Situation: home with daughter    Social:  Social History     Socioeconomic History    Marital status:      Spouse name: Not on file    Number of children: Not on file    Years of education: Not on file    Highest education level: Not on file   Occupational History    Not on file   Social Needs    Financial resource strain: Not on file    Food insecurity:     Worry: Not on file     Inability: Not on file    Transportation needs:     Medical: Not on file     Non-medical: Not on file   Tobacco Use    Smoking status: Never Smoker    Smokeless tobacco: Never Used   Substance and Sexual Activity    Alcohol use: Yes     Alcohol/week: 1.0 standard drinks     Types: 1 Cans of beer per week    Drug use: Not on file    Sexual activity: Not on file   Lifestyle    Physical activity:     Days per week: Not on file     Minutes per session: Not on file    Stress: Not on file   Relationships    Social connections:     Talks on phone: Not on file     Gets together: Not on file     Attends [employer] service: Not on file     Active member of club or organization: Not on file     Attends meetings of clubs or organizations: Not on file     Relationship status: Not on file   Other Topics Concern    Not on file   Social History Narrative    Not on file       Family History:  History reviewed. No pertinent family history.    PHYSICAL:  /79 (BP Location: Left arm, Patient Position: Sitting)   Pulse 101   Ht 5' 11" (1.803 m)   BMI 31.27 kg/m²     General Medical Examination:  General: Good hygiene, appropriate appearance.  HEENT: Normocephalic, atraumatic.   Neck: Supple.   Chest: Unlabored breathing.   CV: Symmetric pulses.   Ext: No clubbing, cyanosis, or edema.     Mental Status:  Mood/Affect: Appropriate/congruent.  Level of consciousness: Awake, alert. Anterocollis, slow processing speed, sialorrhea profound  Orientation: Oriented to person, place, time and situation.  Language: No Dysarthria    Cranial " nerves:  I: Not tested  II: PERRL, VFF to counting  III, IV, VI: EOMI with conjugate gaze and no nystagmus on end gaze  V: Facial sensation intact and symmetric over the bilateral V1-V3  VII: Facial muscle activation intact and symmetric over the bilateral upper and lower face  VIII: Hearing intact in the b/l ears and symmetrical to finger rub  IX, X, XII: TUP midline - no atrophy or fasiculations  X: SCMs and shoulder shrug full strength b/l and symmetric    Motor:   -UE: 5/5 deltoids; 5/5 biceps, triceps; 5/5 wrist flexors, extensors; 5/5 interosseous; 5/5   -LEs: 5/5 hip flexion, extension; 5/5 knee flexion, extension; 5/5 ankle flexion, extension    DTRs:  ? Biceps Triceps Brachioradialis Knee Ankle   Left 2+ 2+ 2+ 2+ 2+   Right 2+ 2+ 2+ 2+ 2+       ? Finger taps Finger flicks KRISTIE Heel taps   Left 0 0 0 0   Right 2+ 1+ 0 1+     Neck tone: 0  ? Arm Leg   Left 0 0   Right 2+ 0     Sensation:   -Light touch: Intact and symmetric in the bilateral upper and lower extremities.    Coordination:   -Finger to nose: nl    Gait:  -Arises from chair with use of hands.  -Casual gait is: narrow based  -Stride length: staggers, FOG profound  -Arm Swing: absent bilat  -Turnin step  -Tandem gait: cannot  Moderate stoop    Laboratory Data:  NA    Imaging:  NA    Neuropsych eval Recs  Current Assessment & Plan        -Neuropsych testing showed extremely slow processing speed, but normal orientation to time and recall/memory. Additionally, his attention/working memory and verbal reasoning (ability to solve problems talking through them without speed requirements) was normal. Language skills were also normal.   -Diagnostically, he is on the borderzone between Mild Cognitive Impairment and Dementia, but his functional impairment is likely more due to motor sxs 2/2 PD than cognitive impairment at this time. So, will keep MCI dx.   -No concerns about capacity and no cognitive/behavioral contraindications prior to DBS.    -Importantly, recommend very slow discussion of DBS and asking him to restate/summarize to ensure adequate comprehension given his severely slow processing speed.   -See other recs below in report          Assessment//Plan:   Problem List Items Addressed This Visit        Neuro    Parkinsonism    Overview     2013 right hand tremor, freezing of gait. Predominantly upper extremities  Failed: sinemet, entacapone, primidone, rasagiline, neupro patch, amantadine, pramipexole, topiramate         Current Assessment & Plan     Longstanding PDism, with therapy resistant tremor.  Concerns for DBS include coumadin use, afib, memory decline with severely low processing speed, and FOG (depending on target). Will try Rytary 95mg up to 4 tabs TID as he has failed other therapies. As he focuses on his tremor, as he has gone without any PD therapy, I worry he will fall from worsening rigidity and FOG.. Suggested PT and walker. Will discuss with DBS team.  Atropine drops for sialorrhea 2 drops in mouth.           Other Visit Diagnoses     Dysphagia, unspecified type    -  Primary    Relevant Orders    Fl Modified Barium Swallow Speech          Follow-up: 2mos  Discussed the importance of ongoing exercise in efforts to improve mobility and balance.    Kinjal Oh MD, MS [facility]  Department of Neurology  Movement Disorders

## 2020-01-21 ENCOUNTER — TELEPHONE (OUTPATIENT)
Dept: NEUROLOGY | Facility: CLINIC | Age: 75
End: 2020-01-21

## 2020-01-21 NOTE — TELEPHONE ENCOUNTER
----- Message from Lakeshia Leone MA sent at 1/21/2020  3:00 PM CST -----  Contact: [facility]      ----- Message -----  From: Marcie Patel  Sent: 1/21/2020   2:23 PM CST  To: [facility] Staff    Please call [facility] at [phone]     Case# [id number]    Prior authorization needed for carbidopa-levodopa (RYTARY) 23.75-95 mg CpSR    Thank you

## 2020-01-21 NOTE — TELEPHONE ENCOUNTER
----- Message from Marcie Patel sent at 1/21/2020  2:23 PM CST -----  Contact: [facility]  Please call [facility] at [phone]     Case# [id number]    Prior authorization needed for carbidopa-levodopa (RYTARY) 23.75-95 mg CpSR    Thank you

## 2020-02-13 ENCOUNTER — TELEPHONE (OUTPATIENT)
Dept: NEUROLOGY | Facility: HOSPITAL | Age: 75
End: 2020-02-13

## 2020-02-13 NOTE — TELEPHONE ENCOUNTER
-right tremor, FOG  -no head imaging  -afib on coumadin    Plan:  -imaging (brain)  -more clinic visits

## 2020-02-19 ENCOUNTER — PATIENT MESSAGE (OUTPATIENT)
Dept: NEUROLOGY | Facility: CLINIC | Age: 75
End: 2020-02-19

## 2020-02-20 ENCOUNTER — TELEPHONE (OUTPATIENT)
Dept: NEUROLOGY | Facility: CLINIC | Age: 75
End: 2020-02-20

## 2020-02-20 DIAGNOSIS — G20.C PARKINSONISM, UNSPECIFIED PARKINSONISM TYPE: Primary | ICD-10-CM

## 2020-02-20 NOTE — TELEPHONE ENCOUNTER
----- Message from Kinjal Oh MD sent at 2/20/2020 12:50 PM CST -----  Please help schedule a brain mri and move his appt up to next available after brain MRI

## 2020-02-29 ENCOUNTER — HOSPITAL ENCOUNTER (OUTPATIENT)
Dept: RADIOLOGY | Facility: HOSPITAL | Age: 75
Discharge: HOME | End: 2020-02-29
Attending: PSYCHIATRY & NEUROLOGY
Payer: MEDICARE

## 2020-02-29 DIAGNOSIS — G20.C PARKINSONISM, UNSPECIFIED PARKINSONISM TYPE: ICD-10-CM

## 2020-02-29 PROCEDURE — 70553 MRI BRAIN STEM W/O & W/DYE: CPT | Mod: TC

## 2020-02-29 PROCEDURE — 70553 MRI BRAIN W WO CONTRAST: ICD-10-PCS | Mod: 26,,, | Performed by: RADIOLOGY

## 2020-02-29 PROCEDURE — 25500020 PHARM REV CODE 255: Performed by: PSYCHIATRY & NEUROLOGY

## 2020-02-29 PROCEDURE — A9585 GADOBUTROL INJECTION: HCPCS | Performed by: PSYCHIATRY & NEUROLOGY

## 2020-02-29 PROCEDURE — 70553 MRI BRAIN STEM W/O & W/DYE: CPT | Mod: 26,,, | Performed by: RADIOLOGY

## 2020-02-29 RX ORDER — GADOBUTROL 604.72 MG/ML
10 INJECTION INTRAVENOUS
Status: COMPLETED | OUTPATIENT
Start: 2020-02-29 | End: 2020-02-29

## 2020-02-29 RX ADMIN — GADOBUTROL 10 ML: 604.72 INJECTION INTRAVENOUS at 02:02

## 2020-03-02 LAB
CREAT SERPL-MCNC: 0.8 MG/DL (ref 0.5–1.4)
SAMPLE: NORMAL

## 2020-03-03 ENCOUNTER — DOCUMENTATION ONLY (OUTPATIENT)
Dept: NEUROLOGY | Facility: CLINIC | Age: 75
End: 2020-03-03

## 2020-03-03 ENCOUNTER — OFFICE VISIT (OUTPATIENT)
Dept: NEUROLOGY | Facility: CLINIC | Age: 75
End: 2020-03-03
Payer: MEDICARE

## 2020-03-03 VITALS
HEART RATE: 90 BPM | HEIGHT: 71 IN | SYSTOLIC BLOOD PRESSURE: 117 MMHG | DIASTOLIC BLOOD PRESSURE: 90 MMHG | BODY MASS INDEX: 29.35 KG/M2 | WEIGHT: 209.69 LBS

## 2020-03-03 DIAGNOSIS — R26.89 PRIMARY FREEZING OF GAIT: Primary | ICD-10-CM

## 2020-03-03 DIAGNOSIS — Z86.73 HISTORY OF STROKE: ICD-10-CM

## 2020-03-03 DIAGNOSIS — G20.C PARKINSONISM, UNSPECIFIED PARKINSONISM TYPE: ICD-10-CM

## 2020-03-03 DIAGNOSIS — Z86.73 HISTORY OF STROKE: Primary | ICD-10-CM

## 2020-03-03 PROCEDURE — 99999 PR PBB SHADOW E&M-EST. PATIENT-LVL III: CPT | Mod: PBBFAC,,, | Performed by: PSYCHIATRY & NEUROLOGY

## 2020-03-03 PROCEDURE — 99215 OFFICE O/P EST HI 40 MIN: CPT | Mod: S$PBB,,, | Performed by: PSYCHIATRY & NEUROLOGY

## 2020-03-03 PROCEDURE — 99999 PR PBB SHADOW E&M-EST. PATIENT-LVL III: ICD-10-PCS | Mod: PBBFAC,,, | Performed by: PSYCHIATRY & NEUROLOGY

## 2020-03-03 PROCEDURE — 99215 PR OFFICE/OUTPT VISIT, EST, LEVL V, 40-54 MIN: ICD-10-PCS | Mod: S$PBB,,, | Performed by: PSYCHIATRY & NEUROLOGY

## 2020-03-03 PROCEDURE — 99213 OFFICE O/P EST LOW 20 MIN: CPT | Mod: PBBFAC | Performed by: PSYCHIATRY & NEUROLOGY

## 2020-03-03 RX ORDER — DONEPEZIL HYDROCHLORIDE 5 MG/1
5 TABLET, FILM COATED ORAL NIGHTLY
Qty: 30 TABLET | Refills: 11 | Status: SHIPPED | OUTPATIENT
Start: 2020-03-03 | End: 2020-05-04 | Stop reason: SDUPTHER

## 2020-03-03 RX ORDER — CANE
1 EACH MISCELLANEOUS ONCE
Qty: 1 EACH | Refills: 0 | Status: SHIPPED | OUTPATIENT
Start: 2020-03-03 | End: 2020-03-03

## 2020-03-03 NOTE — PROGRESS NOTES
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

## 2020-03-03 NOTE — PROGRESS NOTES
"MOVEMENT DISORDERS CLINIC NEW CONSULT NOTE    PCP/Referring Provider: Berthaal Self  No address on file  Date of Service: 3/3/2020    Chief Complaint: Tremor    Interval Hx  Since last visit, underwent DBS panel discussion  Due to atypical features (FOG, abnl processing speed, and bilaterality, medication resistance),  MRI brain was performed  He has evidence of vascular PDism on MRI brain    We tried for gait, which is his primary issue, Rytary 95mg 3 tab TID. He felt that this was no better than carbidopa/levodopa  4 tabs QID made him lethargic and nauseated  No hallucinations noted  Continues to have FOG intermittently    "PriorHPI: Clint Torres is a R HANDED 74 y.o. male with a medical issues significant for Afib on Coumadin, CVA, MCI, throat cancer s/p radiation, with parkinsonism, coming for DBS eval. Daughter noted resting tremor of R hand 5 years ago which has slowly increased over time. At this point he cannot eat or write due to severity of tremor. R hand remains worse than L however he is R handed. His gait slowed soon after. He does not fall often. Does not use a walker or a cane. Can walk 1 block. FOG started 2 years ago. Feet stick to floor and causes imbalance.  Neuro psych eval has noticed MCI with extremely slow processing speed.    For tremor has tried  Sinemet (up to 3 tabs at a time) , primidone, rasagiline, neupro patch, amantadine, pramipexole, entacapone  Nothing has significantly helped - he stopped all therapies since it did not help tremor    Tried PT and [employer]    Neuroleptic exposure:  None"    PD Review of Symptoms:  Anosmia: yes  Dysarthria/Hypophonia: moderate  Dysphagia/Sialorrhea: Yes,   Depression: zoloft  Cognitive slowing: As above  Hallucinations: none  Impulsivity: none  Urinary changes: retension  Constipation: yes  Orthostasis: at times  Dyskinesia: none  Falls: yes  Freezing: yes  Micrographia: yes  Sleep issues:  -RBD: None    Review of Systems:   Review of Systems "   Constitutional: Negative for fever.   HENT: Negative for congestion.    Eyes: Negative for double vision.   Respiratory: Negative for cough and shortness of breath.    Cardiovascular: Negative for chest pain and leg swelling.   Gastrointestinal: Negative for nausea.   Genitourinary: Negative for dysuria.   Musculoskeletal: Positive for falls.   Skin: Negative for rash.   Neurological: Positive for tremors and speech change. Negative for headaches.   Psychiatric/Behavioral: Positive for memory loss. Negative for depression.         Current Medications:  Outpatient Encounter Medications as of 3/3/2020   Medication Sig Dispense Refill    atropine 1% (ISOPTO ATROPINE) 1 % Drop Put 2 drops on tongue for sialorrhea 1 Bottle 5    carbidopa-levodopa (RYTARY) 23.75-95 mg CpSR Take 4 capsules by mouth 3 (three) times daily. 270 capsule 9    docusate sodium (COLACE) 100 MG capsule Take 100 mg by mouth 2 (two) times daily.      gabapentin (NEURONTIN) 300 MG capsule       sertraline (ZOLOFT) 50 MG tablet Take 50 mg by mouth once daily.      warfarin (COUMADIN) 5 MG tablet Take 5 mg by mouth every Mon, Wed, Fri. Sun Tues Thurs Sat....half tablet      cane Laurita 1 Units by Misc.(Non-Drug; Combo Route) route once. for 1 dose 1 each 0    donepezil (ARICEPT) 5 MG tablet Take 5 mg by mouth every evening.      donepezil (ARICEPT) 5 MG tablet Take 1 tablet (5 mg total) by mouth every evening. 30 tablet 11    glimepiride (AMARYL) 2 MG tablet Take 2 mg by mouth 2 (two) times daily.      metFORMIN (GLUCOPHAGE-XR) 500 MG 24 hr tablet Take 500 mg by mouth once daily.      mirtazapine (REMERON) 15 MG tablet Take 1 tablet (15 mg total) by mouth every evening. 90 tablet 2    mirtazapine (REMERON) 15 MG tablet Take 15 mg by mouth every evening.      propranolol (INDERAL LA) 60 MG 24 hr capsule Take 60 mg by mouth once daily.      pyridoxine, vitamin B6, (VITAMIN B-6) 100 MG Tab Take 50 mg by mouth once daily.      simvastatin  (ZOCOR) 40 MG tablet Take 40 mg by mouth every evening.       No facility-administered encounter medications on file as of 3/3/2020.        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 No pertinent surgical history.    Current Living Situation: home with daughter    Social:  Social History     Socioeconomic History    Marital status:      Spouse name: Not on file    Number of children: Not on file    Years of education: Not on file    Highest education level: Not on file   Occupational History    Not on file   Social Needs    Financial resource strain: Not on file    Food insecurity:     Worry: Not on file     Inability: Not on file    Transportation needs:     Medical: Not on file     Non-medical: Not on file   Tobacco Use    Smoking status: Never Smoker    Smokeless tobacco: Never Used   Substance and Sexual Activity    Alcohol use: Yes     Alcohol/week: 1.0 standard drinks     Types: 1 Cans of beer per week    Drug use: Not on file    Sexual activity: Not on file   Lifestyle    Physical activity:     Days per week: Not on file     Minutes per session: Not on file    Stress: Not on file   Relationships    Social connections:     Talks on phone: Not on file     Gets together: Not on file     Attends [employer] service: Not on file     Active member of club or organization: Not on file     Attends meetings of clubs or organizations: Not on file     Relationship status: Not on file   Other Topics Concern    Not on file   Social History Narrative    Not on file       Family History:  History reviewed.  "No pertinent family history.    PHYSICAL:  BP (!) 117/90 (BP Location: Left arm, Patient Position: Sitting)   Pulse 90   Ht 5' 11" (1.803 m)   Wt 95.1 kg (209 lb 10.5 oz)   BMI 29.24 kg/m²     General Medical Examination:  General: Good hygiene, appropriate appearance.  HEENT: Normocephalic, atraumatic.   Neck: Supple.   Chest: Unlabored breathing.   CV: Symmetric pulses.   Ext: No clubbing, cyanosis, or edema.     Mental Status:  Mood/Affect: Appropriate/congruent.  Level of consciousness: Awake, alert. Anterocollis, slow processing speed, sialorrhea profound  Orientation: Oriented to person, place, time and situation.  Language: No Dysarthria    Cranial nerves:  I: Not tested  II: PERRL, VFF to counting  III, IV, VI: EOMI with conjugate gaze and no nystagmus on end gaze  V: Facial sensation intact and symmetric over the bilateral V1-V3  VII: Facial muscle activation intact and symmetric over the bilateral upper and lower face  VIII: Hearing intact in the b/l ears and symmetrical to finger rub  IX, X, XII: TUP midline - no atrophy or fasiculations  X: SCMs and shoulder shrug full strength b/l and symmetric    Motor:   -UE: 5/5 deltoids; 5/5 biceps, triceps; 5/5 wrist flexors, extensors; 5/5 interosseous; 5/5   -LEs: 5/5 hip flexion, extension; 5/5 knee flexion, extension; 5/5 ankle flexion, extension    DTRs:  ? Biceps Triceps Brachioradialis Knee Ankle   Left 2+ 2+ 2+ 2+ 2+   Right 2+ 2+ 2+ 2+ 2+       ? Finger taps Finger flicks KRISTIE Heel taps   Left 0 0 0 0   Right 2+ 1+ 0 1+     Neck tone: 0  ? Arm Leg   Left 0 0   Right 2+ 0     Sensation:   -Light touch: Intact and symmetric in the bilateral upper and lower extremities.    Coordination:   -Finger to nose: nl    Gait:  -Arises from chair with use of hands.  -Casual gait is: narrow based  -Stride length: No FOG today, moderate shuffle  -Arm Swing: absent bilat  -Turnin step  -Tandem gait: cannot  Moderate stoop    Laboratory " Data:  NA    Imaging:  NA    Neuropsych eval Recs  Current Assessment & Plan        -Neuropsych testing showed extremely slow processing speed, but normal orientation to time and recall/memory. Additionally, his attention/working memory and verbal reasoning (ability to solve problems talking through them without speed requirements) was normal. Language skills were also normal.   -Diagnostically, he is on the borderzone between Mild Cognitive Impairment and Dementia, but his functional impairment is likely more due to motor sxs 2/2 PD than cognitive impairment at this time. So, will keep MCI dx.   -No concerns about capacity and no cognitive/behavioral contraindications prior to DBS.   -Importantly, recommend very slow discussion of DBS and asking him to restate/summarize to ensure adequate comprehension given his severely slow processing speed.   -See other recs below in report          Assessment//Plan:   Problem List Items Addressed This Visit        Neuro    History of stroke    Overview     2012         Current Assessment & Plan     Vascular consult given flow voids in carotids and vascular PDism.         Parkinsonism    Overview     2013 right hand tremor, freezing of gait. Predominantly upper extremities  Failed: sinemet, entacapone, primidone, rasagiline, neupro patch, amantadine, pramipexole, topiramate         Current Assessment & Plan     Medication-resistant PDism, with atypical features of FOG, slow processing speed. MRI brain revealed what appears as vascular PDism, explainig his atypical symptoms. We discussed that I am hesitant to try DBS given a possible worsening of cognition and it's historical ineffectiveness in vascular PD. Gait and FOG specifically are his major issues. Will try a laser can and add donepezil 5mg QHS for balance improvement. Suggested aggressive PT.  Continue Rytary as before.           Other Visit Diagnoses     Primary freezing of gait    -  Primary    Relevant Orders    CANE  FOR HOME USE          Follow-up: 2mos  Discussed the importance of ongoing exercise in efforts to improve mobility and balance.    Kinjal Oh MD, MS  [facility]  Department of Neurology  Movement Disorders

## 2020-03-03 NOTE — ASSESSMENT & PLAN NOTE
Medication-resistant PDism, with atypical features of FOG, slow processing speed. MRI brain revealed what appears as vascular PDism, explainig his atypical symptoms. We discussed that I am hesitant to try DBS given a possible worsening of cognition and it's historical ineffectiveness in vascular PD. Gait and FOG specifically are his major issues. Will try a laser can and add donepezil 5mg QHS for balance improvement. Suggested aggressive PT.  Continue Rytary as before.

## 2020-05-01 ENCOUNTER — TELEPHONE (OUTPATIENT)
Dept: NEUROLOGY | Facility: CLINIC | Age: 75
End: 2020-05-01

## 2020-05-01 NOTE — TELEPHONE ENCOUNTER
----- Message from Marcie Patel sent at 5/1/2020 12:11 PM CDT -----  Contact: pt wife   Please call pt wife at [phone]    Patient wife at would like to change the patient appt scheduled on 05/04/2020 to virtual if possible     Thank you

## 2020-05-01 NOTE — TELEPHONE ENCOUNTER
Spoke with Christine Brian, she was informed Monday's appt has been converted to a virtual visit and moved up to 10:20. she says thank you

## 2020-05-04 ENCOUNTER — TELEPHONE (OUTPATIENT)
Dept: NEUROLOGY | Facility: CLINIC | Age: 75
End: 2020-05-04

## 2020-05-04 ENCOUNTER — OFFICE VISIT (OUTPATIENT)
Dept: NEUROLOGY | Facility: CLINIC | Age: 75
End: 2020-05-04
Payer: MEDICARE

## 2020-05-04 DIAGNOSIS — F06.70 MILD NEUROCOGNITIVE DISORDER DUE TO PARKINSON'S DISEASE: ICD-10-CM

## 2020-05-04 DIAGNOSIS — G20.C PARKINSONISM, UNSPECIFIED PARKINSONISM TYPE: ICD-10-CM

## 2020-05-04 DIAGNOSIS — G20.A1 MILD NEUROCOGNITIVE DISORDER DUE TO PARKINSON'S DISEASE: ICD-10-CM

## 2020-05-04 PROCEDURE — 99215 PR OFFICE/OUTPT VISIT, EST, LEVL V, 40-54 MIN: ICD-10-PCS | Mod: 95,,, | Performed by: PSYCHIATRY & NEUROLOGY

## 2020-05-04 PROCEDURE — 99215 OFFICE O/P EST HI 40 MIN: CPT | Mod: 95,,, | Performed by: PSYCHIATRY & NEUROLOGY

## 2020-05-04 RX ORDER — DONEPEZIL HYDROCHLORIDE 5 MG/1
10 TABLET, FILM COATED ORAL NIGHTLY
Qty: 60 TABLET | Refills: 11 | Status: SHIPPED | OUTPATIENT
Start: 2020-05-04 | End: 2021-05-04

## 2020-05-04 NOTE — ASSESSMENT & PLAN NOTE
Medication-resistant PDism, with atypical features of FOG, slow processing speed. MRI brain revealed what appears as vascular PDism, explainig his atypical symptoms. He stopped rytary and reports no difference but he is visibly more bradykinetic R-side. Suggested restart rytary 95mg 3 tabs PO TID. Gait and FOG specifically are his major issues. Will try a laser can and add donepezil 10mg QHS for balance improvement. Suggested aggressive PT.  Continue Rytary as before.

## 2020-05-05 NOTE — TELEPHONE ENCOUNTER
4/2/2020  Last visit #30  Refill 0   12/23/19-  CSA signed, UDS appropriate.  4/14/20- Long appropriate.     Spoke to the patient wife to confirm new appointment date and time.

## 2020-08-28 ENCOUNTER — PATIENT OUTREACH (OUTPATIENT)
Dept: ADMINISTRATIVE | Facility: HOSPITAL | Age: 75
End: 2020-08-28

## 2020-08-28 PROBLEM — E78.5 DYSLIPIDEMIA: Status: ACTIVE | Noted: 2020-08-28

## 2020-08-28 PROBLEM — E11.9 DIABETES: Status: ACTIVE | Noted: 2020-08-28

## 2020-09-04 ENCOUNTER — OFFICE VISIT (OUTPATIENT)
Dept: NEUROLOGY | Facility: CLINIC | Age: 75
End: 2020-09-04
Payer: MEDICARE

## 2020-09-04 VITALS
DIASTOLIC BLOOD PRESSURE: 72 MMHG | BODY MASS INDEX: 29.24 KG/M2 | SYSTOLIC BLOOD PRESSURE: 100 MMHG | HEIGHT: 71 IN | HEART RATE: 78 BPM

## 2020-09-04 DIAGNOSIS — G62.9 NEUROPATHY: ICD-10-CM

## 2020-09-04 DIAGNOSIS — G20.C PARKINSONISM, UNSPECIFIED PARKINSONISM TYPE: ICD-10-CM

## 2020-09-04 DIAGNOSIS — R13.10 DYSPHAGIA, UNSPECIFIED TYPE: Primary | ICD-10-CM

## 2020-09-04 DIAGNOSIS — R13.19 DYSPHAGIA CAUSING PULMONARY ASPIRATION WITH SWALLOWING: ICD-10-CM

## 2020-09-04 PROCEDURE — 99213 OFFICE O/P EST LOW 20 MIN: CPT | Mod: PBBFAC | Performed by: PSYCHIATRY & NEUROLOGY

## 2020-09-04 PROCEDURE — 99215 OFFICE O/P EST HI 40 MIN: CPT | Mod: S$PBB,,, | Performed by: PSYCHIATRY & NEUROLOGY

## 2020-09-04 PROCEDURE — 99215 PR OFFICE/OUTPT VISIT, EST, LEVL V, 40-54 MIN: ICD-10-PCS | Mod: S$PBB,,, | Performed by: PSYCHIATRY & NEUROLOGY

## 2020-09-04 PROCEDURE — 99999 PR PBB SHADOW E&M-EST. PATIENT-LVL III: ICD-10-PCS | Mod: PBBFAC,,, | Performed by: PSYCHIATRY & NEUROLOGY

## 2020-09-04 PROCEDURE — 99999 PR PBB SHADOW E&M-EST. PATIENT-LVL III: CPT | Mod: PBBFAC,,, | Performed by: PSYCHIATRY & NEUROLOGY

## 2020-09-04 RX ORDER — CARBIDOPA AND LEVODOPA 25; 100 MG/1; MG/1
1 TABLET, ORALLY DISINTEGRATING ORAL 3 TIMES DAILY
Qty: 90 TABLET | Refills: 11 | Status: SHIPPED | OUTPATIENT
Start: 2020-09-04 | End: 2021-09-04

## 2020-09-04 NOTE — ASSESSMENT & PLAN NOTE
Medication-resistant PDism, with atypical features of FOG, slow processing speed. MRI brain revealed what appears as vascular PDism, explainig his atypical symptoms. He does however have progressive dysphagia to suggest he does still have a neurodegenerative disease. He stopped rytary and reports no difference but he is visibly more bradykinetic R-side.  Gait and FOG specifically are his major issues.   Suggested swallow study. Will try parcopa 25/100PO TID for safety.    Continue donepezil 10mg QHS for balance improvement. Suggested aggressive PT.

## 2020-09-04 NOTE — PROGRESS NOTES
The patient location is: home  The chief complaint leading to consultation is: PD  Visit type: audiovisual  Total time spent with patient: 20mins  Each patient to whom he or she provides medical services by telemedicine is:  (1) informed of the relationship between the physician and patient and the respective role of any other health care provider with respect to management of the patient; and (2) notified that he or she may decline to receive medical services by telemedicine and may withdraw from such care at any time.    Notes: below      MOVEMENT DISORDERS CLINIC    PCP/Referring Provider: No referring provider defined for this encounter.  Date of Service: 9/4/2020    Chief Complaint: Tremor    Interval Hx  Stopped rytary as he felt that is did not help  Was on Rytary 95mg 3 tab TID, but he discontinued.   Continues to have FOG intermittently - no change after stopping pills  He has not yet picked up a laser cane  Increased to donepezil 10mg QHS - no issues    No swallow study yet - has progressive dysphagia - choking spells    For sialorrhea - tried atropine drops which did not help - aslo scopalamine pacth  Still on coumadin and not a botox a canidate    Does not use an assist device - no falls    R hand resting tremor ongoing    Contnues to have significant bradyphrenia  No hallucinations - memory is declining    Med hx  4 tabs rytary 95mgQID made him lethargic and nauseated  No hallucinations noted    DBS panel  Since last visit, underwent DBS panel discussion  Due to atypical features (FOG, abnl processing speed, and bilaterality, medication resistance),  MRI brain was performed  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 male with a medical issues significant for Afib on Coumadin, CVA, MCI, throat cancer s/p radiation, with parkinsonism, coming for DBS eval. Daughter noted resting tremor of R hand 5 years ago which has slowly increased over time. At this point he cannot eat or write due to severity of tremor. R hand remains worse than L however he is R handed. His gait slowed soon after. He does not fall often. Does not use a walker or a cane. Can walk 1 block. FOG started 2 years ago. Feet stick to floor and causes imbalance.  Neuro psych eval has noticed MCI with extremely slow processing speed.    For tremor has tried  Sinemet (up to 3 tabs at a time) , primidone, rasagiline, neupro patch, amantadine, pramipexole, entacapone  Nothing has significantly helped - he stopped all therapies since it did not help tremor    Tried PT and [employer]    Neuroleptic exposure:  None"        Review of Systems:   Review of Systems   Constitutional: Negative for fever.   HENT: Negative for congestion.    Eyes: Negative for double vision.   Respiratory: Negative for cough and shortness of breath.    Cardiovascular: Negative for chest pain and leg swelling.   Gastrointestinal: Negative for nausea.   Genitourinary: Negative for dysuria.   Musculoskeletal: Positive for falls.   Skin: Negative for rash.   Neurological: Positive for tremors and speech change. Negative for headaches.   Psychiatric/Behavioral: Positive for memory loss. Negative for depression.         Current Medications:  Outpatient Encounter Medications as of 9/4/2020   Medication Sig Dispense Refill    docusate sodium (COLACE) 100 MG capsule Take 100 mg by mouth 2 (two) times daily.      donepezil (ARICEPT) 5 MG tablet Take 5 mg by mouth every evening.      donepeziL (ARICEPT) 5 MG tablet Take 2 tablets (10 mg total) by mouth every evening. 60 tablet 11    " gabapentin (NEURONTIN) 300 MG capsule       mirtazapine (REMERON) 15 MG tablet Take 1 tablet (15 mg total) by mouth every evening. 90 tablet 2    sertraline (ZOLOFT) 50 MG tablet Take 50 mg by mouth once daily.      simvastatin (ZOCOR) 40 MG tablet Take 40 mg by mouth every evening.      warfarin (COUMADIN) 5 MG tablet Take 5 mg by mouth every Mon, Wed, Fri. Sun Tues Thurs Sat....half tablet      atropine 1% (ISOPTO ATROPINE) 1 % Drop Put 2 drops on tongue for sialorrhea (Patient not taking: Reported on 9/4/2020) 1 Bottle 5    carbidopa-levodopa (PARCOPA)  mg per disintegrating tablet Take 1 tablet by mouth 3 (three) times daily. 90 tablet 11    glimepiride (AMARYL) 2 MG tablet Take 2 mg by mouth 2 (two) times daily.      metFORMIN (GLUCOPHAGE-XR) 500 MG 24 hr tablet Take 500 mg by mouth once daily.      mirtazapine (REMERON) 15 MG tablet Take 15 mg by mouth every evening.      propranolol (INDERAL LA) 60 MG 24 hr capsule Take 60 mg by mouth once daily.      pyridoxine, vitamin B6, (VITAMIN B-6) 100 MG Tab Take 50 mg by mouth once daily.      [DISCONTINUED] carbidopa-levodopa (RYTARY) 23.75-95 mg CpSR Take 4 capsules by mouth 3 (three) times daily. (Patient not taking: Reported on 9/4/2020) 270 capsule 9     No facility-administered encounter medications on file as of 9/4/2020.        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    Current Living Situation: home with daughter    Social:  Social History     Socioeconomic History    Marital status:      Spouse name: Not on file    Number of children: Not on file    Years of education: Not on file    Highest education level: Not on file   Occupational History    Not on file   Social Needs    Financial resource strain: Not on file    Food insecurity     Worry: Not on file     Inability: Not on file    Transportation needs     Medical: Not on file     Non-medical: Not on file   Tobacco Use    Smoking status: Never Smoker    Smokeless tobacco: Never Used   Substance and Sexual Activity    Alcohol use: Yes     Alcohol/week: 1.0 standard drinks     Types: 1 Cans of beer per week    Drug use: Not on file    Sexual activity: Not on file   Lifestyle    Physical activity     Days per week: Not on file     Minutes per session: Not on file    Stress: Not on file   Relationships    Social connections     Talks on phone: Not on file     Gets together: Not on file     Attends Rastafari service: Not on file     Active member of club or organization: Not on file     Attends meetings of clubs or organizations: Not on file     Relationship status: Not on file   Other Topics Concern    Not on file   Social History Narrative    Not on file       Family History:  No family history on file.    PHYSICAL:  /72   Pulse 78   Ht 5' 11" (1.803 m)   BMI 29.24 kg/m²     Physical Exam  Constitutional: Well-developed, well-nourished, appears stated age  Eyes: No scleral icterus  ENT: Moist oral mucosa  Cardiovascular: No lower extremity edema   Respiratory: No labored breathing   Skin: No rash   Hematologic: No bruising    Sialorrhea    Other: GI/ deferred   · Mental status:  Awake, alert. Anterocollis, slow processing speed, sialorrhea profound  · Speech: normal (not dysarthric), no aphasia  · Cranial nerves:            · CN II: Pupils " mid-position and equal, not tested light or accommodation  · CN III, IV, VI: Upgaze restricted 100% R beating nystagmus visualized  · CN V: Not tested   · CN VII: Face strong and symmetric bilaterally   · CN VIII: Hearing intact to voice and conversation   · CN IX, X: Palate raises midline and symmetric   · CN XI: Strong shoulder shrug B/L  · CN XII: Tongue appears midline   · Motor: Normal bulk by appearance, no drift   · Sensory: Not tested    · Gait: Moderate stoop with anterocollis - moderately wide gait - mild shuffle  · Deep tendon reflexes: Not tested  · Movement/Coordination                    Mod hypophonic speech.                     Severe facial masking with sialorrhea  Moderate tremor R hand    Bradykinesia  ? Finger taps Finger flicks KRISTIE Heel taps   Left 1+ 1+ - -   Right 3+ 3+ - -         Laboratory Data:  NA    Imaging:  SPENCER with vascular lacunes Midbrain and BG bilaterally    Neuropsych eval Recs  Current Assessment & Plan        -Neuropsych testing showed extremely slow processing speed, but normal orientation to time and recall/memory. Additionally, his attention/working memory and verbal reasoning (ability to solve problems talking through them without speed requirements) was normal. Language skills were also normal.   -Diagnostically, he is on the borderzone between Mild Cognitive Impairment and Dementia, but his functional impairment is likely more due to motor sxs 2/2 PD than cognitive impairment at this time. So, will keep MCI dx.   -No concerns about capacity and no cognitive/behavioral contraindications prior to DBS.   -Importantly, recommend very slow discussion of DBS and asking him to restate/summarize to ensure adequate comprehension given his severely slow processing speed.   -See other recs below in report          Assessment//Plan:   Problem List Items Addressed This Visit        Neuro    Parkinsonism    Overview     2013 right hand tremor, freezing of gait. Predominantly upper  extremities  Failed: sinemet, entacapone, primidone, rasagiline, neupro patch, amantadine, pramipexole, topiramate         Current Assessment & Plan     Medication-resistant PDism, with atypical features of FOG, slow processing speed. MRI brain revealed what appears as vascular PDism, explainig his atypical symptoms. He does however have progressive dysphagia to suggest he does still have a neurodegenerative disease. He stopped rytary and reports no difference but he is visibly more bradykinetic R-side.  Gait and FOG specifically are his major issues.   Suggested swallow study. Will try parcopa 25/100PO TID for safety.    Continue donepezil 10mg QHS for balance improvement. Suggested aggressive PT.           Neuropathy    Current Assessment & Plan     Affecting his gait.           Other Visit Diagnoses     Dysphagia, unspecified type    -  Primary    Relevant Orders    Fl Modified Barium Swallow Speech    SLP video swallow    Dysphagia causing pulmonary aspiration with swallowing         Relevant Orders    Fl Modified Barium Swallow Speech          Follow-up: 2mos  Discussed the importance of ongoing exercise in efforts to improve mobility and balance.    Kinjal Oh MD, MS [facility]  Department of Neurology  Movement Disorders

## 2020-09-10 ENCOUNTER — TELEPHONE (OUTPATIENT)
Dept: NEUROLOGY | Facility: CLINIC | Age: 75
End: 2020-09-10

## 2020-09-10 NOTE — TELEPHONE ENCOUNTER
Spoke with Mrs. Torres, she was informed Mr. Torres appt for 12/10/2020will need to be rescheduled due to Dr. Oh not being in clinic, Mrs. Torres offered 12/22/2020 at 4:20. Mailed appt letter.

## 2020-09-23 DIAGNOSIS — I48.0 PAROXYSMAL ATRIAL FIBRILLATION: Primary | ICD-10-CM

## 2020-09-28 ENCOUNTER — TELEPHONE (OUTPATIENT)
Dept: SPEECH THERAPY | Facility: HOSPITAL | Age: 75
End: 2020-09-28

## 2020-10-01 ENCOUNTER — HOSPITAL ENCOUNTER (OUTPATIENT)
Dept: RADIOLOGY | Facility: HOSPITAL | Age: 75
Discharge: HOME | End: 2020-10-01
Attending: INTERNAL MEDICINE
Payer: MEDICARE

## 2020-10-01 ENCOUNTER — TELEPHONE (OUTPATIENT)
Dept: CARDIOLOGY | Facility: CLINIC | Age: 75
End: 2020-10-01

## 2020-10-01 ENCOUNTER — OFFICE VISIT (OUTPATIENT)
Dept: CARDIOLOGY | Facility: CLINIC | Age: 75
End: 2020-10-01
Payer: MEDICARE

## 2020-10-01 VITALS
DIASTOLIC BLOOD PRESSURE: 70 MMHG | WEIGHT: 196.88 LBS | BODY MASS INDEX: 28.18 KG/M2 | SYSTOLIC BLOOD PRESSURE: 116 MMHG | HEART RATE: 78 BPM | OXYGEN SATURATION: 98 % | HEIGHT: 70 IN

## 2020-10-01 DIAGNOSIS — Z79.01 LONG TERM (CURRENT) USE OF ANTICOAGULANTS: ICD-10-CM

## 2020-10-01 DIAGNOSIS — E11.42 TYPE 2 DIABETES MELLITUS WITH DIABETIC POLYNEUROPATHY, WITHOUT LONG-TERM CURRENT USE OF INSULIN: ICD-10-CM

## 2020-10-01 DIAGNOSIS — Z86.73 HISTORY OF STROKE: ICD-10-CM

## 2020-10-01 DIAGNOSIS — I48.91 ATRIAL FIBRILLATION: ICD-10-CM

## 2020-10-01 DIAGNOSIS — I48.0 PAROXYSMAL ATRIAL FIBRILLATION: ICD-10-CM

## 2020-10-01 DIAGNOSIS — G20.C PARKINSONISM, UNSPECIFIED PARKINSONISM TYPE: ICD-10-CM

## 2020-10-01 DIAGNOSIS — E78.5 DYSLIPIDEMIA: ICD-10-CM

## 2020-10-01 DIAGNOSIS — I48.91 ATRIAL FIBRILLATION, UNSPECIFIED TYPE: Primary | ICD-10-CM

## 2020-10-01 DIAGNOSIS — I48.19 PERSISTENT ATRIAL FIBRILLATION: Primary | ICD-10-CM

## 2020-10-01 DIAGNOSIS — E11.9 TYPE 2 DIABETES MELLITUS WITHOUT COMPLICATION, WITHOUT LONG-TERM CURRENT USE OF INSULIN: ICD-10-CM

## 2020-10-01 DIAGNOSIS — I48.0 PAROXYSMAL ATRIAL FIBRILLATION: Primary | ICD-10-CM

## 2020-10-01 LAB
CREAT SERPL-MCNC: 0.7 MG/DL (ref 0.5–1.4)
SAMPLE: NORMAL

## 2020-10-01 PROCEDURE — 71275 CT ANGIOGRAPHY CHEST: CPT | Mod: TC

## 2020-10-01 PROCEDURE — 99204 PR OFFICE/OUTPT VISIT, NEW, LEVL IV, 45-59 MIN: ICD-10-PCS | Mod: S$PBB,,, | Performed by: INTERNAL MEDICINE

## 2020-10-01 PROCEDURE — 99999 PR PBB SHADOW E&M-EST. PATIENT-LVL IV: CPT | Mod: PBBFAC,,,

## 2020-10-01 PROCEDURE — 71275 CT ANGIOGRAPHY CHEST: CPT | Mod: 26,,, | Performed by: RADIOLOGY

## 2020-10-01 PROCEDURE — 25500020 PHARM REV CODE 255

## 2020-10-01 PROCEDURE — 71275 CTA PORTICO TAVR: ICD-10-PCS | Mod: 26,,, | Performed by: RADIOLOGY

## 2020-10-01 PROCEDURE — 99999 PR PBB SHADOW E&M-EST. PATIENT-LVL IV: ICD-10-PCS | Mod: PBBFAC,,,

## 2020-10-01 PROCEDURE — 99214 OFFICE O/P EST MOD 30 MIN: CPT | Mod: PBBFAC,25

## 2020-10-01 PROCEDURE — 99204 OFFICE O/P NEW MOD 45 MIN: CPT | Mod: S$PBB,,, | Performed by: INTERNAL MEDICINE

## 2020-10-01 RX ORDER — DIPHENHYDRAMINE HCL 25 MG
50 CAPSULE ORAL ONCE
Status: CANCELLED | OUTPATIENT
Start: 2020-10-01 | End: 2020-10-01

## 2020-10-01 RX ORDER — SODIUM CHLORIDE 9 MG/ML
3 INJECTION, SOLUTION INTRAVENOUS CONTINUOUS
Status: CANCELLED | OUTPATIENT
Start: 2020-10-01 | End: 2020-10-01

## 2020-10-01 NOTE — PROGRESS NOTES
Subjective:   Clint Torres is a 74 y.o. male who presents for evaluation of pAfib on OAC for Watchman Candidacy.     Referring Provider: Sukhdeep Dimas MD  Neurologist: Dr Oh    HPI: Mr. Torres is a 74 YOM with PMHx of CVA (2012), mild cognitive impairment, throat cancer s/p radiation, medication resistant Parkinson's, and Afib on Coumadin. He is currently followed by Dr. Oh who is planning on botox injections for sialorrhea that has been resistant to medical therapy. She would like for him to be off of Coumadin prior to proceeding with injections. He presents to Interventional Cardiology clinic today to discuss Watchman Device Candidacy. He would like to be able to discontinue OAC use in order to receive botox injections to help with progression of Parkinson's symptoms. [id number] 6 (9.8%/yr) and HAS-BLED of 3 (3.74%/yr). Currently on Coumadin for oral anticoagulation.     His Parkinson's was initially diagnosed ~2013, initial symptoms included R hand tremor and freezing of gait. He has followed with Neurology closely and has failed multiple medication treatments including sinemet, entacapone, primidone, rasagiline, neuro patch, amantadine, pramipexole, and topiratmate. He is currently on carbidopa/levodopa and donepezil.      Review of Systems   Constitution: Negative for chills and fever.   HENT: Negative for sore throat.    Eyes: Negative for blurred vision.   Cardiovascular: Negative for chest pain, claudication, cyanosis, dyspnea on exertion, irregular heartbeat, leg swelling, near-syncope, orthopnea, palpitations, paroxysmal nocturnal dyspnea and syncope.   Respiratory: Negative for cough and sputum production.    Hematologic/Lymphatic: Does not bruise/bleed easily.   Skin: Negative for itching, rash and suspicious lesions.   Musculoskeletal: Negative for falls.   Gastrointestinal: Negative for abdominal pain and change in bowel habit.   Genitourinary: Negative for dysuria.   Neurological: Positive  for tremors (at rest). Negative for disturbances in coordination, dizziness and loss of balance.   Psychiatric/Behavioral: Negative for altered mental status.        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    PD (Parkinson's disease) 11/14/2017 2013 right hand tremor, freezing of gait Tried: sinemet, entacapone, primidone, rasagiline, neupro patch, amantadine, pramipexole     History reviewed. No pertinent surgical history.    Current Outpatient Medications   Medication Instructions    atropine 1% (ISOPTO ATROPINE) 1 % Drop Put 2 drops on tongue for sialorrhea    carbidopa-levodopa (PARCOPA)  mg per disintegrating tablet 1 tablet, Oral, 3 times daily    docusate sodium (COLACE) 100 mg, Oral, 2 times daily    donepeziL (ARICEPT) 5 mg, Oral, Nightly    donepeziL (ARICEPT) 10 mg, Oral, Nightly    gabapentin (NEURONTIN) 300 MG capsule No dose, route, or frequency recorded.    glimepiride (AMARYL) 2 mg, Oral, 2 times daily    metFORMIN (GLUCOPHAGE-XR) 500 mg, Oral, Daily    mirtazapine (REMERON) 15 mg, Oral, Nightly    mirtazapine (REMERON) 15 mg, Oral, Nightly    propranoloL (INDERAL LA) 60 mg, Oral, Daily    pyridoxine (vitamin B6) (VITAMIN B-6) 50 mg, Oral, Daily    sertraline (ZOLOFT) 50 mg, Oral, Daily    simvastatin (ZOCOR) 40 mg, Oral, Nightly    warfarin (COUMADIN) 5 mg, Oral, Every Mon, Wed, Fri, Sun Tues Thurs Sat....half tablet        Vitals:    10/01/20 1352   BP: 116/70   Pulse: 78       Body mass index is 28.25 kg/m².    Objective:     Physical Exam:  General appearance: well developed, well nourished  Head: normocephalic, atraumatic  Eyes:  conjunctivae/corneas clear. PERRL.  Nose: Nares normal. Septum midline.  Throat: lips, mucosa, and tongue normal; teeth and gums normal and no throat erythema  Neck: supple, symmetrical, trachea midline,- JVD   Lungs:  crackles  bilaterally and normal respiratory effort  Heart: regular rate and rhythm, S1, S2 normal, no murmur, click, rub or gallop  Abdomen: soft, non-tender non-distented;BS posl; no masses,  no organomegaly  Extremities: no  BL LE edema,  Pulses: 2+ DP/PT, BL groins soft w/o hematoma or thrill  Skin: Skin color, texture, turgor normal. No rashes or lesions. Bilateral groin sites intact without signs of bleeding or infection.   Neurologic: Normal strength and tone. No focal numbness or weakness      Test(s) Reviewed  I have reviewed the following in detail:  [] Stress test   [] Angiography   [] Echocardiogram   [x] Labs:   [x] Other: TAVR CTA     Assessment:     Paroxysmal atrial fibrillation  [id number] 6 (9.8%/yr) and HAS-BLED of 3 (3.74%/yr). Currently on Coumadin for oral anticoagulation. This patient is an excellent Watchman Device candidate for BOZENA closure given high bleeding and stroke risk. The procedure was discussed and video was shown.     BOZENA was measured by Cardiac CTA: Avg Diameter 28.0 mm, Depth: 20.3-23.3 mm, Width: 26.5-28.1 mm.      Long term (current) use of anticoagulants  On Coumadin.     History of stroke  Hx of embolic CVA in 2012.     Parkinsonism  Followed by Dr. Oh. Of note, he is currently having difficulty with sialorrhea resistant to medical therapy. He will need cautious airway protection during his procedure.     Diabetes  Stable. On Metformin.       Plan:     1. He is a 35 mm Watchman FLX candidate, per Dr. Saul. (measurements above) via right common femoral access.   2. Start ASA 81 mg today.  3. Discontinue Coumadin 3 days prior to procedure.   4. Plans post Watchman:  · Resume ASA/Coumadin therapy for 45 days.   · Clinic follow up and PERLA in 45 days.  · If no nona-device leak in 45 days, then discontinue Coumadin and start Plavix.  · Continue ASA indefinitely. Discontinue Plavix after 4.5 months.   5.   OK to discontinue Plavix for up to a week for botox injections with Dr. Oh  after procedure. Continue ASA.        Heidy Doran PA-C  Valve and Structural Heart Disease  Adult Congenital Heart Disease  [facility]

## 2020-10-01 NOTE — ASSESSMENT & PLAN NOTE
[id number] 6 (9.8%/yr) and HAS-BLED of 3 (3.74%/yr). Currently on Coumadin for oral anticoagulation. This patient is an excellent Watchman Device candidate for BOZENA closure given high bleeding and stroke risk. The procedure was discussed and video was shown.     BOZENA was measured by Cardiac CTA: Avg Diameter 28.0 mm, Depth: 20.3-23.3 mm, Width: 26.5-28.1 mm.

## 2020-10-01 NOTE — ASSESSMENT & PLAN NOTE
Followed by Dr. Dimas. Of note, he is currently having difficulty with sialorrhea resistant to medical therapy. He will need cautious airway protection during his procedure.

## 2020-10-01 NOTE — TELEPHONE ENCOUNTER
You have been scheduled for your procedure on Wednesday, October 21, 2020.  Please report to the Cardiology Waiting Area on the Third floor of the hospital and check in at 6 AM. You will then be taken to the SSCU (Short Stay Cardiac Unit) and prepared for your procedure. Please be aware that this is not the time of your procedure but the time that you are to arrive.     Preperations for your procedure  1. Shower with Dial soap the night before and the morning of your procedure.  2. Call the office for any signs of infection ( fever, cough, pneumonia, urinary tract, etc.) We cannot implant a device in an infected patient.      You may not have anything to eat or drink after midnight the night before your test. You may take your regular morning medications with as much water as necessary. If there are any medications that you should not take you will be instructed to hold them that morning. If you are diabetic and on Metformin (Glucophage) do not take it the day before, the day of, and for 2 days after your procedure. If you are on Pradaxa, Xarelto, Eliquis, or Coumadin hold 3 days prior to your procedure.     Start Coumadin 5mg daily two days prior to your procedure if you are not currently taking it.     The entire procedure may take up to three hours. After the procedure, you will return to your room on the third floor where you will be monitored closely for the next several hours.     Your length of stay in the hospital will be determined by your physician. You may be discharged the same day if your physician is satisfied with your progress.     Follow up After Your Procedure  About 45 days after your procedure you will be required to have a Transesophageal echo and a clinic visit with your physician at [facility] in [place].   At 6 months, 1 year, and 2 years after your procedure, you will receive a phone call follow up with one of our nurses.  You can follow up with your regular Cardiologist regarding  any other heart issues.     If you should have any questions, concerns, or need to change the date of your procedure, please call  CHERYLE Alvarado @ [phone]    Special Instructions:  none      THE ABOVE INSTRUCTIONS WERE GIVEN TO THE PATIENT VERBALLY AND THEY VERBALIZED UNDERSTANDING.  THEY DO NOT REQUIRE ANY SPECIAL NEEDS AND DO NOT HAVE ANY LEARNING BARRIERS.          Directions for Reporting to Cardiology Waiting Area in the Hospital  If you park in the Parking Garage:  Take elevators to the1st floor of the parking garage.  Continue past the gift shop, coffee shop, and piano.  Take a right and go to the gold elevators. (Elevator B)  Take the elevator to the 3rd floor.  Follow the arrow on the sign on the wall that says Cath Lab Registration/EP Lab Registration.  Follow the long hallway all the way around until you come to a big open area.  This is the registration area.  Check in at Reception Desk.    OR    If family is dropping you off:  Have them drop you off at the front of the Hospital under the green overhang.  Enter through the doors and take a right.  Take the E elevators to the 3rd floor Cardiology Waiting Area.  Check in at the Reception Desk in the waiting room.

## 2020-10-08 ENCOUNTER — CLINICAL SUPPORT (OUTPATIENT)
Dept: SPEECH THERAPY | Facility: HOSPITAL | Age: 75
End: 2020-10-08
Attending: PSYCHIATRY & NEUROLOGY
Payer: MEDICARE

## 2020-10-08 ENCOUNTER — HOSPITAL ENCOUNTER (OUTPATIENT)
Dept: RADIOLOGY | Facility: HOSPITAL | Age: 75
Discharge: HOME | End: 2020-10-08
Attending: PSYCHIATRY & NEUROLOGY
Payer: MEDICARE

## 2020-10-08 DIAGNOSIS — R13.19 DYSPHAGIA CAUSING PULMONARY ASPIRATION WITH SWALLOWING: ICD-10-CM

## 2020-10-08 DIAGNOSIS — R13.10 DYSPHAGIA, UNSPECIFIED TYPE: ICD-10-CM

## 2020-10-08 DIAGNOSIS — R13.12 DYSPHAGIA, OROPHARYNGEAL: Primary | ICD-10-CM

## 2020-10-08 DIAGNOSIS — G20.A1 PD (PARKINSON'S DISEASE): ICD-10-CM

## 2020-10-08 DIAGNOSIS — Z92.3 HISTORY OF HEAD AND NECK RADIATION: ICD-10-CM

## 2020-10-08 DIAGNOSIS — Z85.89 HISTORY OF HEAD AND NECK CANCER: ICD-10-CM

## 2020-10-08 PROCEDURE — A9698 NON-RAD CONTRAST MATERIALNOC: HCPCS | Performed by: PSYCHIATRY & NEUROLOGY

## 2020-10-08 PROCEDURE — 74230 X-RAY XM SWLNG FUNCJ C+: CPT | Mod: 26,,, | Performed by: RADIOLOGY

## 2020-10-08 PROCEDURE — 74230 FL MODIFIED BARIUM SWALLOW SPEECH STUDY: ICD-10-PCS | Mod: 26,,, | Performed by: RADIOLOGY

## 2020-10-08 PROCEDURE — 92611 MOTION FLUOROSCOPY/SWALLOW: CPT | Mod: GN | Performed by: SPEECH-LANGUAGE PATHOLOGIST

## 2020-10-08 PROCEDURE — 74230 X-RAY XM SWLNG FUNCJ C+: CPT | Mod: TC

## 2020-10-08 PROCEDURE — 25500020 PHARM REV CODE 255: Performed by: PSYCHIATRY & NEUROLOGY

## 2020-10-08 RX ADMIN — BARIUM SULFATE 45 ML: 0.81 POWDER, FOR SUSPENSION ORAL at 02:10

## 2020-10-13 NOTE — PROGRESS NOTES
MODIFIED BARIUM SWALLOW STUDY    REASON FOR REFERRAL:  Clint Torres, age 74, was referred by Dr. Kinjal Oh, neurologist,  for a Modified Barium Swallow Study to rule out aspiration, assess his overall swallowing function, and determine safest consistencies for oral intake. He was accompanied by his wife, Clarice.    Mr. 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 His teeth were extracted related to his treatment. Since onset of PD, he has developed significant kyphosis such that his head/neck are nearly parallel to the floor.    His wife reported that he has been choking with drinking/eating x2 years and seems to have more difficulty with liquids than foods.  He continues on a regular consistency diet (cut small) with thin liquids. Takes pills with liquids or embedded in puree; seems better embedded in puree per Mrs. Torres.  Things seem to get stuck at times.   She denied that he has had an aspiration pneumonia.  He has lost 10 lbs over the past several months.      MEDICAL HISTORY:  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    PD (Parkinson's disease) 11/14/2017 2013 right hand tremor, freezing of gait Tried: sinemet, entacapone, primidone, rasagiline, neupro patch, amantadine, pramipexole        SURGICAL HISTORY:  Past Surgical History:   Procedure Laterality Date    DISSECTION OF NECK      tonsillectomy         SWALLOWING HISTORY:  As above.    FAMILY HISTORY:  Family History   Problem Relation Age of Onset    No Known Problems Mother     No Known Problems Father     No Known Problems Sister     Heart disease Brother     No Known Problems Maternal Aunt     No Known Problems Maternal Uncle     No Known Problems Paternal Aunt     No Known Problems Paternal Uncle     No Known Problems Maternal  Grandmother     No Known Problems Maternal Grandfather     No Known Problems Paternal Grandmother     No Known Problems Paternal Grandfather     Anemia Neg Hx     Arrhythmia Neg Hx     Asthma Neg Hx     Clotting disorder Neg Hx     Fainting Neg Hx     Heart attack Neg Hx     Heart failure Neg Hx     Hyperlipidemia Neg Hx     Hypertension Neg Hx     Stroke Neg Hx     Atrial Septal Defect Neg Hx         SOCIAL HISTORY:   And Mrs. Torres live in [facility]       Tobacco:  Never smoker.  ETOH:  Yes, per EMR; 1 can beer/week.    BEHAVIOR:  Clint Torres was a pleasant man who had a flat affect and limited social interaction.  He did not verbalize and sometimes did not respond with nonverbal communication (e.g., head nods or shakes), but he did spontaneously use a thumbs-up in response to information provided at the end of the study.  He ambulated with stand-by assistance.   He was able to cooperate adequately during the study.  Results of today's assessment were considered indicative of his current levels of swallowing functioning.      HEARING:  Subjectively, within normal limits.     ORAL PERIPHERAL:   Informal examination of the oral mechanism revealed structures within normal limits for swallowing and speech purposes.  See below for function.    Voice quality was not observed.    TEST FINDINGS:   Mr. Torres was seen in Radiology with the Radiologist for a Modified Barium Swallow Study.  He was seated on a stool for a left lateral videofluoroscopic view.  Due to his posture and inability to change it, his head/neck were essentially parallel to the floor.    Consistencies assessed using radiopaque barium contrast:  thin (5-mL boluses via medicine cup and single swallows via open cup and straw),   nectar  (5-mL boluses via spoon and single swallows via open cup), honey (5-mL via spoon),  thin puree (half-teaspoon) via spoon -- could not initiate swallow, aborted,  thick puree (half-teaspoon) via spoon  -- could not initiate swallow, aborted,   solid (half cracker boluses) by Mr. Torres's hand, and   13 mm barium tablet embedded in puree -- could not initiate swallow, aborted.    Strategies:  Straw use -- not effective; unable to draw thin liquid   Additional swallows -- resulted in SILENT aspiration with thin and nectar-thick liquids, cleared honey-thick liquids and masticated solids after several (e.g., 4-5)    Phases:  Oral:  Mr. Torres was able to obtain liquid from spoon or open cup (not straw) and strip utensils adequately with no loss of material from the oral cavity.  His posture made drinking from any sort of cup challenging, but he was able to use a regular open cup adequately.  Delivery by spoon worked best.  He moved thin liquid, nectar-thick, honey-thick, and masticated solid boluses through the oral cavity with slow transit time.  Tongue pumping observed. He was unable to initiate any a-p transit with thin or thick purees and these were spit out.  There was no pooling of liquids in the mouth.  Swallow reflex was triggered after material (thin liquid and masticated solid) was spilled to the valleculae.    Pharyngeal:  Thin and nectar-thick boluses moved through the pharyngeal phase with small volumes of laryngeal penetration that led to aspiration after initial swallows; there was no nasal regurgitation.   There was no penetration or aspiration with masticated solid.    - Delayed to absent initiation, depending on consistency.  - Adequate soft palate elevation  - Reduced laryngeal elevation and anterior hyoid excursion  - Reduced tongue base retraction  - Complete epiglottic inversion  - Complete laryngeal vestibular closure  - Reduced pharyngeal stripping wave  - PE segment opening was obscured due to his posture; no evidence of retrograde flow observed..  -  Moderate pharyngeal residue in BOT, valleculae and pyriforms    Cervical Esophageal:  Boluses entered the upper esophagus within normal limits.   No obstruction was noted.    Rosenbeck 8-point Penetration-Aspiration Scale:   Best: 1 - Material does not enter airway. -- honey-thick liquid, masticated solid  Worst:    8 - Material enters the airway, passes below the vocal folds, and no effort is made to eject. -- thin and nectar-thick liquids    Reviewed recommendations for consideration of alternative means of nutrition delivery and they reported they were somewhat prepared for the possibility. Advised discussion with Dr. Oh.      IMPRESSIONS:   This 74 y.o. man appears to present with  1.  Severe oropharyngeal dysphagia characterized by delayed and slowed a-p transit, tongue pumping, delayed initiation of swallow (and some absent initiation of swallow, depending on consistency), reduce pharyngeal contraction in the context of kyphotic postureresulting in SILENT aspiration of thin and nectar-thick liquids (primarily after the swallow), increased effort to clear pharyngeal residue, and poor ability to progress some consistencies.  2.  History head and neck radiation.  3.  S/p tonsillectomy and ND.  4.  History tonsil cancer.  5.  PD.      RECOMMENDATIONS/PLAN OF CARE:   It is felt that Mr. Torres would benefit from  1.  Consideration of non-oral means of delivery of primary nutrition, hydration, and medication.  His high risk of aspiration, reduced physical activity, and the increased work required to clear boluses make reliance of solely PO intake a risk to his health.  2.  Use of masticated solids and honey-thick liquids for pleasure (e.g., 2-3x/day in small volumes [2-4 oz or less]) using the following safe swallowing strateiges:   A.  5-mL of honey-thick liquids (spoon delivery recommended) followed by 4-5 dry swallows   B.  1/2 teaspoon boluses of masticated solids followed by 4-5 dry swallows to clear.   C.  Monitoring for any signs/symptoms of aspiration (such as wet/gurgly voice that does not clear with coughing, inability to make any voice sounds,  any persistent coughing with oral intake, otherwise unexplained fever, unexplained increased or new difficulty or discomfort breathing, unexplained increase in sleepiness/lethargy/significant fatigue, unexplained increase or new onset confusion or change in cognitive functioning, or any other unexplained change in health or well-being that could be related to swallowing).  3.  Continued follow up with Dr. Oh as directed.  4.  Repeat MBSS as needed.

## 2020-10-13 NOTE — PROGRESS NOTES
Called wife- discussed that given his poor swallow he needs to thicken liquids at the very least and also discuss goals of care which may require a Gtube for nutrition to decrease but not eliminate aspiration risk

## 2020-10-13 NOTE — PLAN OF CARE
IMPRESSIONS:   This 74 y.o. man appears to present with  1.  Severe oropharyngeal dysphagia characterized by delayed and slowed a-p transit, tongue pumping, delayed initiation of swallow (and some absent initiation of swallow, depending on consistency), reduce pharyngeal contraction in the context of kyphotic postureresulting in SILENT aspiration of thin and nectar-thick liquids (primarily after the swallow), increased effort to clear pharyngeal residue, and poor ability to progress some consistencies.  2.  History head and neck radiation.  3.  S/p tonsillectomy and ND.  4.  History tonsil cancer.  5.  PD.      RECOMMENDATIONS/PLAN OF CARE:   It is felt that Mr. Torres would benefit from  1.  Consideration of non-oral means of delivery of primary nutrition, hydration, and medication.  His high risk of aspiration, reduced physical activity, and the increased work required to clear boluses make reliance of solely PO intake a risk to his health.  2.  Use of masticated solids and honey-thick liquids for pleasure (e.g., 2-3x/day in small volumes [2-4 oz or less]) using the following safe swallowing strateiges:   A.  5-mL of honey-thick liquids (spoon delivery recommended) followed by 4-5 dry swallows   B.  1/2 teaspoon boluses of masticated solids followed by 4-5 dry swallows to clear.   C.  Monitoring for any signs/symptoms of aspiration (such as wet/gurgly voice that does not clear with coughing, inability to make any voice sounds, any persistent coughing with oral intake, otherwise unexplained fever, unexplained increased or new difficulty or discomfort breathing, unexplained increase in sleepiness/lethargy/significant fatigue, unexplained increase or new onset confusion or change in cognitive functioning, or any other unexplained change in health or well-being that could be related to swallowing).  3.  Continued follow up with Dr. Oh as directed.  4.  Repeat MBSS as needed.

## 2020-10-20 ENCOUNTER — ANESTHESIA EVENT (OUTPATIENT)
Dept: MEDSURG UNIT | Facility: HOSPITAL | Age: 75
DRG: 274 | End: 2020-10-20
Payer: MEDICARE

## 2020-10-20 ENCOUNTER — LAB VISIT (OUTPATIENT)
Dept: SURGERY | Facility: CLINIC | Age: 75
DRG: 274 | End: 2020-10-20
Payer: MEDICARE

## 2020-10-20 DIAGNOSIS — I48.91 ATRIAL FIBRILLATION: ICD-10-CM

## 2020-10-20 LAB — SARS-COV-2 RNA RESP QL NAA+PROBE: NOT DETECTED

## 2020-10-20 PROCEDURE — U0003 INFECTIOUS AGENT DETECTION BY NUCLEIC ACID (DNA OR RNA); SEVERE ACUTE RESPIRATORY SYNDROME CORONAVIRUS 2 (SARS-COV-2) (CORONAVIRUS DISEASE [COVID-19]), AMPLIFIED PROBE TECHNIQUE, MAKING USE OF HIGH THROUGHPUT TECHNOLOGIES AS DESCRIBED BY CMS-2020-01-R: HCPCS

## 2020-10-20 NOTE — ANESTHESIA PREPROCEDURE EVALUATION
10/20/2020  Clint Torres is a 74 y.o., male.  [facility]  Anesthesia Pre-Operative Evaluation         Patient Name: Clint Torres  YOB: 1945  MRN: [id number]    SUBJECTIVE:     Pre-operative evaluation for Procedure(s) (LRB):  Left atrial appendage closure device (N/A)     10/20/2020    Clint Torres is a 74 y.o. male w/ a significant PMHx of CVA (2012), mild cognitive impairment, throat cancer s/p radiation, medication resistant Parkinson's, Afib on Coumadin, DM    Patient now presents for the above procedure(s).      LDA: None documented.       Prev airway: None documented.    Drips: None documented.      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      Current Outpatient Medications on File Prior to Encounter   Medication Sig Dispense Refill    atropine 1% (ISOPTO ATROPINE) 1 % Drop Put 2 drops on tongue for sialorrhea 1 Bottle 5    carbidopa-levodopa (PARCOPA)  mg per disintegrating tablet Take 1 tablet by mouth 3 (three) times daily. 90 tablet 11    docusate sodium (COLACE) 100 MG capsule Take 100 mg by mouth  2 (two) times daily.      donepezil (ARICEPT) 5 MG tablet Take 5 mg by mouth every evening.      donepeziL (ARICEPT) 5 MG tablet Take 2 tablets (10 mg total) by mouth every evening. 60 tablet 11    gabapentin (NEURONTIN) 300 MG capsule       glimepiride (AMARYL) 2 MG tablet Take 2 mg by mouth 2 (two) times daily.      metFORMIN (GLUCOPHAGE-XR) 500 MG 24 hr tablet Take 500 mg by mouth once daily.      mirtazapine (REMERON) 15 MG tablet Take 1 tablet (15 mg total) by mouth every evening. 90 tablet 2    mirtazapine (REMERON) 15 MG tablet Take 15 mg by mouth every evening.      propranolol (INDERAL LA) 60 MG 24 hr capsule Take 60 mg by mouth once daily.      pyridoxine, vitamin B6, (VITAMIN B-6) 100 MG Tab Take 50 mg by mouth once daily.      sertraline (ZOLOFT) 50 MG tablet Take 50 mg by mouth once daily.      simvastatin (ZOCOR) 40 MG tablet Take 40 mg by mouth every evening.      warfarin (COUMADIN) 5 MG tablet Take 5 mg by mouth every Mon, Wed, Fri. Sun Tues Thurs Sat....half tablet         Past Surgical History:   Procedure Laterality Date    DISSECTION OF NECK      tonsillectomy         Social History     Socioeconomic History    Marital status:      Spouse name: Not on file    Number of children: Not on file    Years of education: Not on file    Highest education level: Not on file   Occupational History    Not on file   Social Needs    Financial resource strain: Not on file    Food insecurity     Worry: Not on file     Inability: Not on file    Transportation needs     Medical: Not on file     Non-medical: Not on file   Tobacco Use    Smoking status: Never Smoker    Smokeless tobacco: Never Used   Substance and Sexual Activity    Alcohol use: Yes     Alcohol/week: 1.0 standard drinks     Types: 1 Cans of beer per week    Drug use: Never    Sexual activity: Not on file   Lifestyle    Physical activity     Days per week: Not on file     Minutes per session: Not on file     Stress: Not on file   Relationships    Social connections     Talks on phone: Not on file     Gets together: Not on file     Attends [employer] service: Not on file     Active member of club or organization: Not on file     Attends meetings of clubs or organizations: Not on file     Relationship status: Not on file   Other Topics Concern    Not on file   Social History Narrative    Not on file       OBJECTIVE:     Vital Signs Range (Last 24H):         Significant Labs:  Lab Results   Component Value Date    WBC 6.81 10/01/2020    HGB 18.0 10/01/2020    HCT 55.2 (H) 10/01/2020     10/01/2020     10/01/2020    K 3.9 10/01/2020     10/01/2020    CREATININE 0.7 10/01/2020    BUN 14 10/01/2020    CO2 30 (H) 10/01/2020    TSH 1.923 12/12/2017    INR 3.1 (H) 10/01/2020    HGBA1C 5.5 12/12/2017       Diagnostic Studies: No relevant studies.    EKG:   No results found for this or any previous visit.    2D ECHO:  TTE:  No results found for this or any previous visit.    PERLA:  No results found for this or any previous visit.    ASSESSMENT/PLAN:       Anesthesia Evaluation    I have reviewed the Patient Summary Reports.      I have reviewed the Medications.     Review of Systems  Anesthesia Hx:  No problems with previous Anesthesia Denies Hx of Anesthetic complications  History of prior surgery of interest to airway management or planning: Previous anesthesia: General Denies Family Hx of Anesthesia complications.   Denies Personal Hx of Anesthesia complications.   Social:  Non-Smoker, No Alcohol Use    Hematology/Oncology:  Hematology Normal   Oncology Normal     EENT/Dental:EENT/Dental Normal   Cardiovascular:   Exercise tolerance: poor CHF  Functional Capacity good / => 4 METS    Pulmonary:  Pulmonary Normal    Renal/:  Renal/ Normal     Hepatic/GI:  Hepatic/GI Normal    Musculoskeletal:  Musculoskeletal Normal    Neurological:  Neurology Normal C-spine cleared.      Endocrine:   Diabetes     Dermatological:  Skin Normal    Psych:   Psychiatric History          Physical Exam  General:  Well nourished    Airway/Jaw/Neck:  Airway Findings: Mouth Opening: Normal Tongue: Normal  General Airway Assessment: Adult  Mallampati: III  Improves to II with phonation.  TM Distance: Normal, at least 6 cm  Jaw/Neck Findings:  Neck ROM: Normal ROM      Dental:  Dental Findings: In tact   Chest/Lungs:  Chest/Lungs Findings: Normal Respiratory Rate, Clear to auscultation     Heart/Vascular:  Heart Findings: Rate: Normal  Rhythm: Irregularly Irregular  Sounds: Normal        Mental Status:  Mental Status Findings:  Cooperative, Alert and Oriented         Anesthesia Plan  Type of Anesthesia, risks & benefits discussed:  Anesthesia Type:  general  Patient's Preference:   Intra-op Monitoring Plan: standard ASA monitors  Intra-op Monitoring Plan Comments:   Post Op Pain Control Plan: multimodal analgesia, IV/PO Opioids PRN and per primary service following discharge from PACU  Post Op Pain Control Plan Comments:   Induction:   IV  Beta Blocker:  Patient is on a Beta-Blocker and has received one dose within the past 24 hours (No further documentation required).       Informed Consent: Patient understands risks and agrees with Anesthesia plan.  Questions answered. Anesthesia consent signed with patient.  ASA Score: 3     Day of Surgery Review of History & Physical: I have interviewed and examined the patient. I have reviewed the patient's H&P dated:    H&P update referred to the provider.         Ready For Surgery From Anesthesia Perspective.

## 2020-10-20 NOTE — HPI
Mango Torres is a 75 y/o M with h/o CVA (2012), mild cognitive impairment, throat cancer s/p radiation, h/o pAfib on coumadin, T2DM who presents for LAAO device (Watchman) placement. He is on coumadin for CVA prophylaxis. [id number] 6 (9.8%/yr) and HAS-BLED of 3 (3.74%/yr). Currently on Coumadin for oral anticoagulation.     He has treatment resistant sialorrhea and plans for botox injections, prior to witch Dr. Oh would like for the patient to be off coumadin. He was referred to Interventional Cardiology and was seen by Dr. Saul 10/1/20 and assessed to be an appropriate candidate for a 35 mm Watchman FLX device placement.     BOZENA was measured by Cardiac CTA: Avg Diameter 28.0 mm, Depth: 20.3-23.3 mm, Width: 26.5-28.1 mm.      ECHO 5/2014 ([facility]): EF 40%, global hypokinesis, trace AI    Dysphagia or odynophagia:  Yes - due to Parkinsons  Liver Disease, esophageal disease, or known varices:  No  Upper GI Bleeding: No  Snoring:  No  Sleep Apnea:  No  Prior neck surgery or radiation:  Yes - s/p right neck dissection and radiation for throat cancer (2003)  History of anesthetic difficulties:  No  Family history of anesthetic difficulties:  No  Last oral intake:  12 hours ago  Able to move neck in all directions:  Yes  Stroke History: 2012 - no residual defecits  Last dose of anticoagulation:  3 days ago  Anticoagulation: Coumadin  Antiplatelets: None  Platelet count:  Lab Results   Component Value Date     10/01/2020     INR:   Lab Results   Component Value Date    INR 3.1 (H) 10/01/2020     PTT:   Lab Results   Component Value Date    APTT 45.1 (H) 10/01/2020

## 2020-10-20 NOTE — ASSESSMENT & PLAN NOTE
- 1. PERLA for evaluation of LA thrombus prior to watchman placement  ASA: 2  Mallampati: 3   EF: 40 % (Norm in 2014)    -No absolute contraindications of esophageal stricture, tumor, perforation, laceration,or diverticulum and/or active GI bleed  -The risks, benefits & alternatives of the procedure were explained to the patient.   -The risks of transesophageal echo include but are not limited to:  Dental trauma, esophageal trauma/perforation, bleeding, laryngospasm/brochospasm, aspiration, sore throat/hoarseness, & dislodgement of the endotracheal tube/nasogastric tube (where applicable).    -The risks of moderate sedation include hypotension, respiratory depression, arrhythmias, bronchospasm, & death.    -Informed consent was obtained. The patient is agreeable to proceed with the procedure and all questions and concerns addressed.    Case discussed with an attending in echocardiography lab.     Further recommendations per attending addendum

## 2020-10-21 ENCOUNTER — HOSPITAL ENCOUNTER (OUTPATIENT)
Dept: CARDIOLOGY | Facility: HOSPITAL | Age: 75
Discharge: HOME | DRG: 274 | End: 2020-10-21
Attending: INTERNAL MEDICINE
Payer: MEDICARE

## 2020-10-21 ENCOUNTER — ANESTHESIA (OUTPATIENT)
Dept: MEDSURG UNIT | Facility: HOSPITAL | Age: 75
DRG: 274 | End: 2020-10-21
Payer: MEDICARE

## 2020-10-21 ENCOUNTER — HOSPITAL ENCOUNTER (INPATIENT)
Facility: HOSPITAL | Age: 75
LOS: 1 days | Discharge: HOME | DRG: 274 | End: 2020-10-21
Attending: INTERNAL MEDICINE | Admitting: INTERNAL MEDICINE
Payer: MEDICARE

## 2020-10-21 VITALS
WEIGHT: 196 LBS | SYSTOLIC BLOOD PRESSURE: 117 MMHG | BODY MASS INDEX: 27.44 KG/M2 | TEMPERATURE: 98 F | DIASTOLIC BLOOD PRESSURE: 72 MMHG | OXYGEN SATURATION: 94 % | HEART RATE: 78 BPM | RESPIRATION RATE: 20 BRPM | HEIGHT: 71 IN

## 2020-10-21 VITALS
WEIGHT: 196 LBS | SYSTOLIC BLOOD PRESSURE: 135 MMHG | BODY MASS INDEX: 27.44 KG/M2 | HEIGHT: 71 IN | DIASTOLIC BLOOD PRESSURE: 86 MMHG

## 2020-10-21 DIAGNOSIS — I48.91 ATRIAL FIBRILLATION, UNSPECIFIED TYPE: ICD-10-CM

## 2020-10-21 DIAGNOSIS — I48.19 PERSISTENT ATRIAL FIBRILLATION: ICD-10-CM

## 2020-10-21 LAB
ABO + RH BLD: NORMAL
ANION GAP SERPL CALC-SCNC: 5 MMOL/L (ref 8–16)
AORTIC ROOT ANNULUS: 3.9 CM
APTT BLDCRRT: 33.4 SEC (ref 21–32)
ASCENDING AORTA: 3.9 CM
BLD GP AB SCN CELLS X3 SERPL QL: NORMAL
BSA FOR ECHO PROCEDURE: 2.11 M2
BUN SERPL-MCNC: 20 MG/DL (ref 8–23)
CALCIUM SERPL-MCNC: 9.2 MG/DL (ref 8.7–10.5)
CHLORIDE SERPL-SCNC: 104 MMOL/L (ref 95–110)
CO2 SERPL-SCNC: 32 MMOL/L (ref 23–29)
CREAT SERPL-MCNC: 0.7 MG/DL (ref 0.5–1.4)
ERYTHROCYTE [DISTWIDTH] IN BLOOD BY AUTOMATED COUNT: 13.2 % (ref 11.5–14.5)
EST. GFR  (AFRICAN AMERICAN): >60 ML/MIN/1.73 M^2
EST. GFR  (NON AFRICAN AMERICAN): >60 ML/MIN/1.73 M^2
GLUCOSE SERPL-MCNC: 91 MG/DL (ref 70–110)
HCT VFR BLD AUTO: 52.8 % (ref 40–54)
HGB BLD-MCNC: 17.4 G/DL (ref 14–18)
INR PPP: 1.4 (ref 0.8–1.2)
MCH RBC QN AUTO: 30.5 PG (ref 27–31)
MCHC RBC AUTO-ENTMCNC: 33 G/DL (ref 32–36)
MCV RBC AUTO: 93 FL (ref 82–98)
PLATELET # BLD AUTO: 206 K/UL (ref 150–350)
PMV BLD AUTO: 9.5 FL (ref 9.2–12.9)
POCT GLUCOSE: 81 MG/DL (ref 70–110)
POTASSIUM SERPL-SCNC: 4.1 MMOL/L (ref 3.5–5.1)
PROTHROMBIN TIME: 15.4 SEC (ref 9–12.5)
RBC # BLD AUTO: 5.7 M/UL (ref 4.6–6.2)
SODIUM SERPL-SCNC: 141 MMOL/L (ref 136–145)
STJ: 3.3 CM
WBC # BLD AUTO: 6.83 K/UL (ref 3.9–12.7)

## 2020-10-21 PROCEDURE — 27200677 HC TRANSDUCER MONITOR KIT SINGLE: Performed by: ANESTHESIOLOGY

## 2020-10-21 PROCEDURE — C1760 CLOSURE DEV, VASC: HCPCS | Performed by: INTERNAL MEDICINE

## 2020-10-21 PROCEDURE — 93010 ELECTROCARDIOGRAM REPORT: CPT | Mod: ,,, | Performed by: INTERNAL MEDICINE

## 2020-10-21 PROCEDURE — 86901 BLOOD TYPING SEROLOGIC RH(D): CPT

## 2020-10-21 PROCEDURE — 93355 ECHO TRANSESOPHAGEAL (TEE): CPT | Mod: ,,, | Performed by: INTERNAL MEDICINE

## 2020-10-21 PROCEDURE — 80048 BASIC METABOLIC PNL TOTAL CA: CPT

## 2020-10-21 PROCEDURE — C1894 INTRO/SHEATH, NON-LASER: HCPCS | Performed by: INTERNAL MEDICINE

## 2020-10-21 PROCEDURE — D9220A PRA ANESTHESIA: ICD-10-PCS | Mod: Q0,GC,, | Performed by: ANESTHESIOLOGY

## 2020-10-21 PROCEDURE — 11000001 HC ACUTE MED/SURG PRIVATE ROOM

## 2020-10-21 PROCEDURE — 27201423 OPTIME MED/SURG SUP & DEVICES STERILE SUPPLY: Performed by: INTERNAL MEDICINE

## 2020-10-21 PROCEDURE — 25000003 PHARM REV CODE 250: Performed by: INTERNAL MEDICINE

## 2020-10-21 PROCEDURE — A4216 STERILE WATER/SALINE, 10 ML: HCPCS | Performed by: STUDENT IN AN ORGANIZED HEALTH CARE EDUCATION/TRAINING PROGRAM

## 2020-10-21 PROCEDURE — 25000003 PHARM REV CODE 250: Performed by: STUDENT IN AN ORGANIZED HEALTH CARE EDUCATION/TRAINING PROGRAM

## 2020-10-21 PROCEDURE — 99499 UNLISTED E&M SERVICE: CPT | Mod: ,,, | Performed by: INTERNAL MEDICINE

## 2020-10-21 PROCEDURE — 93355 TRANSESOPHAGEAL ECHO (TEE) (CUPID ONLY): ICD-10-PCS | Mod: ,,, | Performed by: INTERNAL MEDICINE

## 2020-10-21 PROCEDURE — 63600175 PHARM REV CODE 636 W HCPCS: Performed by: STUDENT IN AN ORGANIZED HEALTH CARE EDUCATION/TRAINING PROGRAM

## 2020-10-21 PROCEDURE — 36620 ARTERIAL: ICD-10-PCS | Mod: 59,,, | Performed by: ANESTHESIOLOGY

## 2020-10-21 PROCEDURE — 85730 THROMBOPLASTIN TIME PARTIAL: CPT

## 2020-10-21 PROCEDURE — D9220A PRA ANESTHESIA: Mod: Q0,GC,, | Performed by: ANESTHESIOLOGY

## 2020-10-21 PROCEDURE — C1887 CATHETER, GUIDING: HCPCS | Performed by: INTERNAL MEDICINE

## 2020-10-21 PROCEDURE — 82962 GLUCOSE BLOOD TEST: CPT | Performed by: INTERNAL MEDICINE

## 2020-10-21 PROCEDURE — 37000009 HC ANESTHESIA EA ADD 15 MINS: Performed by: INTERNAL MEDICINE

## 2020-10-21 PROCEDURE — 33340 PERQ CLSR TCAT L ATR APNDGE: CPT | Mod: Q0,GC,, | Performed by: INTERNAL MEDICINE

## 2020-10-21 PROCEDURE — 85610 PROTHROMBIN TIME: CPT

## 2020-10-21 PROCEDURE — 63600175 PHARM REV CODE 636 W HCPCS: Performed by: INTERNAL MEDICINE

## 2020-10-21 PROCEDURE — 36620 INSERTION CATHETER ARTERY: CPT | Mod: 59,,, | Performed by: ANESTHESIOLOGY

## 2020-10-21 PROCEDURE — 27800505 HC CATH, RADIAL ARTERY KIT: Performed by: ANESTHESIOLOGY

## 2020-10-21 PROCEDURE — 93005 ELECTROCARDIOGRAM TRACING: CPT

## 2020-10-21 PROCEDURE — 85027 COMPLETE CBC AUTOMATED: CPT

## 2020-10-21 PROCEDURE — 33340 PR TRANSCATH CLOSURE, PERC, LEFT ATRIAL APPENDAGE, W/IMPLANT: ICD-10-PCS | Mod: Q0,GC,, | Performed by: INTERNAL MEDICINE

## 2020-10-21 PROCEDURE — 27800903 OPTIME MED/SURG SUP & DEVICES OTHER IMPLANTS: Performed by: INTERNAL MEDICINE

## 2020-10-21 PROCEDURE — 37000008 HC ANESTHESIA 1ST 15 MINUTES: Performed by: INTERNAL MEDICINE

## 2020-10-21 PROCEDURE — 93010 EKG 12-LEAD: ICD-10-PCS | Mod: ,,, | Performed by: INTERNAL MEDICINE

## 2020-10-21 PROCEDURE — 93325 DOPPLER ECHO COLOR FLOW MAPG: CPT

## 2020-10-21 PROCEDURE — 99499 NO LOS: ICD-10-PCS | Mod: ,,, | Performed by: INTERNAL MEDICINE

## 2020-10-21 PROCEDURE — 25500020 PHARM REV CODE 255: Performed by: INTERNAL MEDICINE

## 2020-10-21 PROCEDURE — C1769 GUIDE WIRE: HCPCS | Performed by: INTERNAL MEDICINE

## 2020-10-21 PROCEDURE — 33340 PERQ CLSR TCAT L ATR APNDGE: CPT | Mod: Q0 | Performed by: INTERNAL MEDICINE

## 2020-10-21 DEVICE — LEFT ATRIAL APPENDAGE CLOSURE DEVICE WITH DELIVERY SYSTEM
Type: IMPLANTABLE DEVICE | Site: HEART | Status: FUNCTIONAL
Brand: WATCHMAN FLX™

## 2020-10-21 RX ORDER — HEPARIN SODIUM 1000 [USP'U]/ML
INJECTION, SOLUTION INTRAVENOUS; SUBCUTANEOUS
Status: DISCONTINUED | OUTPATIENT
Start: 2020-10-21 | End: 2020-10-21

## 2020-10-21 RX ORDER — PHENYLEPHRINE HYDROCHLORIDE 10 MG/ML
INJECTION INTRAVENOUS
Status: DISCONTINUED | OUTPATIENT
Start: 2020-10-21 | End: 2020-10-21

## 2020-10-21 RX ORDER — ACETAMINOPHEN 325 MG/1
650 TABLET ORAL EVERY 4 HOURS PRN
Status: DISCONTINUED | OUTPATIENT
Start: 2020-10-21 | End: 2020-10-21 | Stop reason: HOSPADM

## 2020-10-21 RX ORDER — PROPOFOL 10 MG/ML
VIAL (ML) INTRAVENOUS
Status: DISCONTINUED | OUTPATIENT
Start: 2020-10-21 | End: 2020-10-21

## 2020-10-21 RX ORDER — NOREPINEPHRINE BITARTRATE 1 MG/ML
INJECTION, SOLUTION INTRAVENOUS
Status: DISCONTINUED | OUTPATIENT
Start: 2020-10-21 | End: 2020-10-21

## 2020-10-21 RX ORDER — LIDOCAINE HYDROCHLORIDE 20 MG/ML
INJECTION, SOLUTION INFILTRATION; PERINEURAL
Status: DISCONTINUED | OUTPATIENT
Start: 2020-10-21 | End: 2020-10-21 | Stop reason: HOSPADM

## 2020-10-21 RX ORDER — ASPIRIN 81 MG/1
81 TABLET ORAL DAILY
COMMUNITY

## 2020-10-21 RX ORDER — CEFAZOLIN SODIUM 1 G/3ML
INJECTION, POWDER, FOR SOLUTION INTRAMUSCULAR; INTRAVENOUS
Status: DISCONTINUED | OUTPATIENT
Start: 2020-10-21 | End: 2020-10-21

## 2020-10-21 RX ORDER — PROTAMINE SULFATE 10 MG/ML
INJECTION, SOLUTION INTRAVENOUS
Status: DISCONTINUED | OUTPATIENT
Start: 2020-10-21 | End: 2020-10-21

## 2020-10-21 RX ORDER — SODIUM CHLORIDE 9 MG/ML
3 INJECTION, SOLUTION INTRAVENOUS CONTINUOUS
Status: ACTIVE | OUTPATIENT
Start: 2020-10-21 | End: 2020-10-21

## 2020-10-21 RX ORDER — SODIUM CHLORIDE 9 MG/ML
INJECTION, SOLUTION INTRAVENOUS CONTINUOUS
Status: ACTIVE | OUTPATIENT
Start: 2020-10-21 | End: 2020-10-21

## 2020-10-21 RX ORDER — HEPARIN SODIUM 200 [USP'U]/100ML
INJECTION, SOLUTION INTRAVENOUS
Status: DISCONTINUED | OUTPATIENT
Start: 2020-10-21 | End: 2020-10-21 | Stop reason: HOSPADM

## 2020-10-21 RX ORDER — DIPHENHYDRAMINE HCL 50 MG
50 CAPSULE ORAL ONCE
Status: COMPLETED | OUTPATIENT
Start: 2020-10-21 | End: 2020-10-21

## 2020-10-21 RX ORDER — FENTANYL CITRATE 50 UG/ML
INJECTION, SOLUTION INTRAMUSCULAR; INTRAVENOUS
Status: DISCONTINUED | OUTPATIENT
Start: 2020-10-21 | End: 2020-10-21

## 2020-10-21 RX ORDER — ONDANSETRON 2 MG/ML
4 INJECTION INTRAMUSCULAR; INTRAVENOUS DAILY PRN
Status: DISCONTINUED | OUTPATIENT
Start: 2020-10-21 | End: 2020-10-21 | Stop reason: HOSPADM

## 2020-10-21 RX ORDER — LIDOCAINE HYDROCHLORIDE 20 MG/ML
INJECTION INTRAVENOUS
Status: DISCONTINUED | OUTPATIENT
Start: 2020-10-21 | End: 2020-10-21

## 2020-10-21 RX ORDER — ONDANSETRON 2 MG/ML
INJECTION INTRAMUSCULAR; INTRAVENOUS
Status: DISCONTINUED | OUTPATIENT
Start: 2020-10-21 | End: 2020-10-21

## 2020-10-21 RX ADMIN — PROTAMINE SULFATE 100 MG: 10 INJECTION, SOLUTION INTRAVENOUS at 09:10

## 2020-10-21 RX ADMIN — SODIUM CHLORIDE, SODIUM GLUCONATE, SODIUM ACETATE, POTASSIUM CHLORIDE, MAGNESIUM CHLORIDE, SODIUM PHOSPHATE, DIBASIC, AND POTASSIUM PHOSPHATE: .53; .5; .37; .037; .03; .012; .00082 INJECTION, SOLUTION INTRAVENOUS at 08:10

## 2020-10-21 RX ADMIN — DIPHENHYDRAMINE HYDROCHLORIDE 50 MG: 50 CAPSULE ORAL at 06:10

## 2020-10-21 RX ADMIN — SODIUM CHLORIDE 3 ML/KG/HR: 0.9 INJECTION, SOLUTION INTRAVENOUS at 06:10

## 2020-10-21 RX ADMIN — FENTANYL CITRATE 25 MCG: 50 INJECTION INTRAMUSCULAR; INTRAVENOUS at 08:10

## 2020-10-21 RX ADMIN — CEFAZOLIN 2 G: 330 INJECTION, POWDER, FOR SOLUTION INTRAMUSCULAR; INTRAVENOUS at 08:10

## 2020-10-21 RX ADMIN — LIDOCAINE HYDROCHLORIDE 100 MG: 20 INJECTION, SOLUTION INTRAVENOUS at 08:10

## 2020-10-21 RX ADMIN — SODIUM CHLORIDE: 0.9 INJECTION, SOLUTION INTRAVENOUS at 10:10

## 2020-10-21 RX ADMIN — PROPOFOL 20 MG: 10 INJECTION, EMULSION INTRAVENOUS at 08:10

## 2020-10-21 RX ADMIN — SODIUM CHLORIDE, SODIUM GLUCONATE, SODIUM ACETATE, POTASSIUM CHLORIDE, MAGNESIUM CHLORIDE, SODIUM PHOSPHATE, DIBASIC, AND POTASSIUM PHOSPHATE: .53; .5; .37; .037; .03; .012; .00082 INJECTION, SOLUTION INTRAVENOUS at 09:10

## 2020-10-21 RX ADMIN — ONDANSETRON 4 MG: 2 INJECTION, SOLUTION INTRAMUSCULAR; INTRAVENOUS at 09:10

## 2020-10-21 RX ADMIN — PHENYLEPHRINE HYDROCHLORIDE 100 MCG: 10 INJECTION, SOLUTION INTRAMUSCULAR; INTRAVENOUS; SUBCUTANEOUS at 09:10

## 2020-10-21 RX ADMIN — HUMAN ALBUMIN MICROSPHERES AND PERFLUTREN 0.66 MG: 10; .22 INJECTION, SOLUTION INTRAVENOUS at 08:10

## 2020-10-21 RX ADMIN — HEPARIN SODIUM 10000 UNITS: 1000 INJECTION INTRAVENOUS; SUBCUTANEOUS at 09:10

## 2020-10-21 RX ADMIN — NOREPINEPHRINE BITARTRATE 8 MCG: 1 INJECTION INTRAVENOUS at 08:10

## 2020-10-21 RX ADMIN — DEXMEDETOMIDINE HYDROCHLORIDE 0.5 MCG/KG/HR: 100 INJECTION, SOLUTION, CONCENTRATE INTRAVENOUS at 08:10

## 2020-10-21 RX ADMIN — SODIUM CHLORIDE 1000 ML: 0.9 INJECTION, SOLUTION INTRAVENOUS at 11:10

## 2020-10-21 NOTE — PLAN OF CARE
Pt d/c'd to home per md orders. Pt ambulated unit prior to discharge.   Vss.  r groin site remains otto.  0 bleed, 0 hematoma.  pivs removed intact and without difficulty.  Instructed pt and spouse on home medications, post procedure precautions and follow up visits.  Pt and spouse verbalizes understanding. Pt in no acute distress and verbalizes no complaints.

## 2020-10-21 NOTE — SUBJECTIVE & OBJECTIVE
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    gabapentin (NEURONTIN) 300 MG capsule     atropine 1% (ISOPTO ATROPINE) 1 % Drop Put 2 drops on tongue for sialorrhea    carbidopa-levodopa (PARCOPA)  mg per disintegrating tablet Take 1 tablet by mouth 3 (three) times daily.    docusate sodium (COLACE) 100 MG capsule Take 100 mg by mouth 2 (two) times daily.    donepezil (ARICEPT) 5 MG tablet Take 5 mg by mouth every evening.    donepeziL (ARICEPT) 5 MG tablet Take 2 tablets (10 mg total) by mouth every evening.    glimepiride (AMARYL) 2 MG tablet Take 2 mg by mouth 2 (two) times daily.    metFORMIN (GLUCOPHAGE-XR) 500 MG 24 hr tablet Take 500 mg by mouth once daily.    mirtazapine (REMERON) 15 MG tablet Take 1 tablet (15 mg total) by mouth every evening.    mirtazapine (REMERON) 15 MG tablet Take 15 mg by mouth every evening.    propranolol (INDERAL LA) 60 MG 24 hr capsule Take 60 mg by mouth once daily.    pyridoxine, vitamin B6, (VITAMIN B-6) 100 MG Tab Take 50 mg by mouth once daily.    sertraline (ZOLOFT) 50 MG tablet Take 50 mg by mouth once daily.    simvastatin (ZOCOR) 40 MG tablet Take 40 mg by mouth every evening.    warfarin (COUMADIN) 5 MG tablet Take 5 mg by mouth every Mon, Wed, Fri. Sun Tues Thurs Sat....half tablet     Family History     Problem Relation (Age of  Onset)    Heart disease Brother    No Known Problems Mother, Father, Sister, Maternal Aunt, Maternal Uncle, Paternal Aunt, Paternal Uncle, Maternal Grandmother, Maternal Grandfather, Paternal Grandmother, Paternal Grandfather        Tobacco Use    Smoking status: Never Smoker    Smokeless tobacco: Never Used   Substance and Sexual Activity    Alcohol use: Yes     Alcohol/week: 1.0 standard drinks     Types: 1 Cans of beer per week    Drug use: Never    Sexual activity: Not on file     Review of Systems   Constitution: Negative for chills, fever and weight loss.   HENT: Negative for sore throat and stridor.    Eyes: Negative for blurred vision, double vision and pain.   Cardiovascular: Negative for chest pain, claudication, dyspnea on exertion, near-syncope, orthopnea, palpitations, paroxysmal nocturnal dyspnea and syncope.   Respiratory: Negative for cough, shortness of breath, sputum production and wheezing.    Endocrine: Negative for polydipsia, polyphagia and polyuria.   Skin: Negative for rash.   Musculoskeletal: Negative for joint pain, joint swelling and myalgias.   Gastrointestinal: Positive for dysphagia. Negative for abdominal pain, constipation, diarrhea, heartburn, melena, nausea and vomiting.   Genitourinary: Negative for dysuria and hematuria.   Neurological: Positive for tremors. Negative for focal weakness and loss of balance.   Psychiatric/Behavioral: Negative for depression and memory loss.     Objective:     Vital Signs (Most Recent):  Temp: 97 °F (36.1 °C) (10/21/20 0634)  Pulse: 95 (10/21/20 0634)  Resp: 20 (10/21/20 0634)  BP: 112/76 (10/21/20 0634)  SpO2: 98 % (10/21/20 0634) Vital Signs (24h Range):  Temp:  [97 °F (36.1 °C)] 97 °F (36.1 °C)  Pulse:  [95] 95  Resp:  [20] 20  SpO2:  [98 %] 98 %  BP: (112)/(76) 112/76     Weight: 88.9 kg (196 lb)  Body mass index is 27.34 kg/m².  SpO2: 98 %     No intake or output data in the 24 hours ending 10/21/20 0644    Lines/Drains/Airways     None                Physical Exam   Constitutional: He is oriented to person, place, and time. He appears well-developed and well-nourished.   HENT:   Head: Normocephalic.   Eyes: Pupils are equal, round, and reactive to light.   Neck: Normal range of motion. No JVD present.   Cardiovascular: Normal rate and regular rhythm.   No murmur heard.  Pulmonary/Chest: Effort normal and breath sounds normal. No respiratory distress. He has no wheezes. He has no rales.   Abdominal: Soft. Bowel sounds are normal. He exhibits no distension. There is no abdominal tenderness.   Musculoskeletal: Normal range of motion.         General: No edema.   Neurological: He is alert and oriented to person, place, and time.   Skin: Skin is warm and dry.     Significant Labs: In process this AM

## 2020-10-21 NOTE — NURSING TRANSFER
Nursing Transfer Note      10/21/2020     Transfer To: [facility]    Transfer via stretcher    Transfer with cardiac monitoring    Transported by RN    Chart send with patient: Yes    Notified: spouse    Patient reassessed at: 10/21/2020 1300

## 2020-10-21 NOTE — PLAN OF CARE
Received report from PACU RN. Patient s/p alonzo, Paola3. VSS, no c/o pain or discomfort at this time, resp even and unlabored. Gauze/tegaderm dressing to R groin is CDI. No active bleeding. No hematoma noted. Post procedure protocol reviewed with patient and patient's family. Understanding verbalized. Family members at bedside. Nurse call bell within reach. Will continue to monitor per post procedure protocol.

## 2020-10-21 NOTE — ASSESSMENT & PLAN NOTE
Clint Torres is a 74 y.o. male with a history of non valvular, Paroxysmal Atrial Fibrillation referred by Dr Sukhdeep Dimas for evaluation of LAAO with Watchman Device.    The patient has undergone the following work-up:    [id number] 6 (9.8%/yr) and HAS-BLED of 3 (3.74%/yr).   BOZENA measurements by CTA (Date:10/1/2020): Avg Diameter 28.0 mm, width 26.5-28.1 mm, depth 20.3-23.3 mm.   Current anticoagulation/antiplatelet regimen: Coumadin   Increased risk of falls: Yes   Clinically relevant bleeding event: no   Attempt at atrial fibrillation termination: no   Additional history/risk factors: Parkinson's, difficulty swallowing, needs Botox injections but cannot receive them due to Coumadin   Modified Jessi Scale: : Jessi Score of 1 - No significant disability despite symptoms; able to carry out all usual duties and activities    - Plan for LAAO with a 35 mm Watchman Flex device today given high bleeding and stroke risk  - R CFV Access

## 2020-10-21 NOTE — BRIEF OP NOTE
Brief Operative Note:    : Preet Saul MD     Referring Physician: Preet Saul     All Operators: Surgeon(s):  MD Evangelista Cotton MD Madhav Prakash Upadhyaya, MD Mohanad A. Hasan, MD     Preoperative Diagnosis: Atrial fibrillation, unspecified type [I48.91]     Postop Diagnosis: Atrial fibrillation, unspecified type [I48.91]    Treatments/Procedures: Procedure(s) (LRB):  Left atrial appendage closure device (N/A)    Findings: Successful 31mm Watchman FLX implantation. See catheterization report for full details.    Estimated Blood loss: 20 cc    Specimens removed: No    Preet Saul

## 2020-10-21 NOTE — CONSULTS
[facility] - Short Stay Cardiac Unit  Cardiology  Consult Note    Patient Name: Clint Torres  MRN: [id number]  Admission Date: 10/21/2020  Hospital Length of Stay: 0 days  Code Status: No Order   Attending Provider: Preet Saul MD   Consulting Provider: Carlee Stevens MD  Primary Care Physician: Grzegorz Echavarria MD  Principal Problem:Paroxysmal atrial fibrillation    Patient information was obtained from patient, past medical records and ER records.     Consults  Subjective:     Chief Complaint:  Atrial fibrillation     HPI:   Mango Torres is a 75 y/o M with h/o CVA (2012), mild cognitive impairment, throat cancer s/p radiation, h/o pAfib on coumadin, T2DM who presents for LAAO device (Watchman) placement. He is on coumadin for CVA prophylaxis. [id number] 6 (9.8%/yr) and HAS-BLED of 3 (3.74%/yr). Currently on Coumadin for oral anticoagulation.     He has treatment resistant sialorrhea and plans for botox injections, prior to witch Dr. Oh would like for the patient to be off coumadin. He was referred to Interventional Cardiology and was seen by Dr. Saul 10/1/20 and assessed to be an appropriate candidate for a 35 mm Watchman FLX device placement.     BOZENA was measured by Cardiac CTA: Avg Diameter 28.0 mm, Depth: 20.3-23.3 mm, Width: 26.5-28.1 mm.      ECHO 5/2014 ([facility]): EF 40%, global hypokinesis, trace AI    Dysphagia or odynophagia:  Yes - due to Parkinsons  Liver Disease, esophageal disease, or known varices:  No  Upper GI Bleeding: No  Snoring:  No  Sleep Apnea:  No  Prior neck surgery or radiation:  Yes - s/p right neck dissection and radiation for throat cancer (2003)  History of anesthetic difficulties:  No  Family history of anesthetic difficulties:  No  Last oral intake:  12 hours ago  Able to move neck in all directions:  Yes  Stroke History: 2012 - no residual defecits  Last dose of anticoagulation:  3 days ago  Anticoagulation: Coumadin  Antiplatelets: None  Platelet count:  Lab  Results   Component Value Date     10/01/2020     INR:   Lab Results   Component Value Date    INR 3.1 (H) 10/01/2020     PTT:   Lab Results   Component Value Date    APTT 45.1 (H) 10/01/2020       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      Current Outpatient Medications on File Prior to Encounter   Medication Sig    aspirin (ECOTRIN) 81 MG EC tablet Take 81 mg by mouth once daily.    gabapentin (NEURONTIN) 300 MG capsule     atropine 1% (ISOPTO ATROPINE) 1 % Drop Put 2 drops on tongue for sialorrhea    carbidopa-levodopa (PARCOPA)  mg per disintegrating tablet Take 1 tablet by mouth 3 (three) times daily.    docusate sodium (COLACE) 100 MG capsule Take 100 mg by mouth 2 (two) times daily.    donepezil (ARICEPT) 5 MG tablet Take 5 mg by mouth every evening.    donepeziL (ARICEPT) 5 MG tablet Take 2 tablets (10 mg total) by mouth every evening.    glimepiride (AMARYL) 2 MG tablet Take 2 mg by mouth 2 (two) times daily.    metFORMIN (GLUCOPHAGE-XR) 500 MG 24 hr tablet Take 500 mg by mouth once daily.    mirtazapine (REMERON) 15 MG tablet Take 1 tablet (15 mg total) by mouth every evening.    mirtazapine (REMERON) 15 MG tablet Take 15 mg by mouth every evening.    propranolol (INDERAL LA) 60 MG 24 hr capsule Take 60 mg by mouth once daily.    pyridoxine, vitamin B6, (VITAMIN B-6) 100 MG Tab Take 50  mg by mouth once daily.    sertraline (ZOLOFT) 50 MG tablet Take 50 mg by mouth once daily.    simvastatin (ZOCOR) 40 MG tablet Take 40 mg by mouth every evening.    warfarin (COUMADIN) 5 MG tablet Take 5 mg by mouth every Mon, Wed, Fri. Sun Tues Thurs Sat....half tablet     Family History     Problem Relation (Age of Onset)    Heart disease Brother    No Known Problems Mother, Father, Sister, Maternal Aunt, Maternal Uncle, Paternal Aunt, Paternal Uncle, Maternal Grandmother, Maternal Grandfather, Paternal Grandmother, Paternal Grandfather        Tobacco Use    Smoking status: Never Smoker    Smokeless tobacco: Never Used   Substance and Sexual Activity    Alcohol use: Yes     Alcohol/week: 1.0 standard drinks     Types: 1 Cans of beer per week    Drug use: Never    Sexual activity: Not on file     Review of Systems   Constitution: Negative for chills, fever and weight loss.   HENT: Negative for sore throat and stridor.    Eyes: Negative for blurred vision, double vision and pain.   Cardiovascular: Negative for chest pain, claudication, dyspnea on exertion, irregular heartbeat, near-syncope, orthopnea, palpitations, paroxysmal nocturnal dyspnea and syncope.   Respiratory: Negative for cough, shortness of breath, sputum production and wheezing.    Endocrine: Negative for polydipsia, polyphagia and polyuria.   Skin: Negative for rash.   Musculoskeletal: Negative for joint pain, joint swelling and myalgias.   Gastrointestinal: Positive for dysphagia. Negative for abdominal pain, constipation, diarrhea, heartburn, melena, nausea and vomiting.   Genitourinary: Negative for dysuria and hematuria.   Neurological: Positive for tremors. Negative for focal weakness, headaches, loss of balance and weakness.   Psychiatric/Behavioral: Negative for depression and memory loss.     Objective:     Vital Signs (Most Recent):  Temp: 97 °F (36.1 °C) (10/21/20 0634)  Pulse: 95 (10/21/20 0634)  Resp: 20 (10/21/20 0634)  BP:  112/76 (10/21/20 0634)  SpO2: 98 % (10/21/20 0634) Vital Signs (24h Range):  Temp:  [97 °F (36.1 °C)] 97 °F (36.1 °C)  Pulse:  [95] 95  Resp:  [20] 20  SpO2:  [98 %] 98 %  BP: (112)/(76) 112/76     Weight: 88.9 kg (196 lb)  Body mass index is 27.34 kg/m².    SpO2: 98 %       No intake or output data in the 24 hours ending 10/21/20 0659    Lines/Drains/Airways     Peripheral Intravenous Line                 Peripheral IV - Single Lumen 10/21/20 0650 18 G Left Antecubital less than 1 day                Physical Exam   Constitutional: He is oriented to person, place, and time. He appears well-developed and well-nourished.   HENT:   Head: Normocephalic.   Eyes: Pupils are equal, round, and reactive to light.   Neck: Normal range of motion. No JVD present.   Cardiovascular: Normal rate and regular rhythm.   No murmur heard.  Pulmonary/Chest: Effort normal and breath sounds normal. No respiratory distress. He has no wheezes. He has no rales.   Abdominal: Soft. Bowel sounds are normal. He exhibits no distension. There is no abdominal tenderness.   Musculoskeletal: Normal range of motion.         General: No edema.   Neurological: He is alert and oriented to person, place, and time.   Skin: Skin is warm and dry.       Significant Labs: CMP No results for input(s): NA, K, CL, CO2, GLU, BUN, CREATININE, CALCIUM, PROT, ALBUMIN, BILITOT, ALKPHOS, AST, ALT, ANIONGAP, ESTGFRAFRICA, EGFRNONAA in the last 48 hours. and CBC No results for input(s): WBC, HGB, HCT, PLT in the last 48 hours.    Significant Imaging: All pertinent imaging reviewed    Assessment and Plan:     * Paroxysmal atrial fibrillation  - 1. PERLA for evaluation of LA thrombus prior to watchman placement  ASA: 2  Mallampati: 3   EF: 40 % (Norm in 2014)    -No absolute contraindications of esophageal stricture, tumor, perforation, laceration,or diverticulum and/or active GI bleed  -The risks, benefits & alternatives of the procedure were explained to the patient.    -The risks of transesophageal echo include but are not limited to:  Dental trauma, esophageal trauma/perforation, bleeding, laryngospasm/brochospasm, aspiration, sore throat/hoarseness, & dislodgement of the endotracheal tube/nasogastric tube (where applicable).    -The risks of moderate sedation include hypotension, respiratory depression, arrhythmias, bronchospasm, & death.    -Informed consent was obtained. The patient is agreeable to proceed with the procedure and all questions and concerns addressed.    Case discussed with an attending in echocardiography lab.     Further recommendations per attending addendum      Thank you for your consult. I will follow-up with patient. Please contact us if you have any additional questions.    Carlee Stevens MD  Cardiology   [facility] - [facility] Stay Cardiac Unit

## 2020-10-21 NOTE — ANESTHESIA POSTPROCEDURE EVALUATION
Anesthesia Post Evaluation    Patient: Clint Torres    Procedure(s) Performed: Procedure(s) (LRB):  Left atrial appendage closure device (N/A)    Final Anesthesia Type: general    Patient location during evaluation: PACU  Patient participation: Yes- Able to Participate  Level of consciousness: awake and alert and oriented  Post-procedure vital signs: reviewed and stable  Pain management: adequate  Airway patency: patent    PONV status at discharge: No PONV  Anesthetic complications: no      Cardiovascular status: blood pressure returned to baseline, hemodynamically stable and stable  Respiratory status: unassisted, room air and spontaneous ventilation  Hydration status: euvolemic  Follow-up not needed.          Vitals Value Taken Time   /72 10/21/20 1430   Temp 36.6 °C (97.9 °F) 10/21/20 1300   Pulse 92 10/21/20 1430   Resp 20 10/21/20 1320   SpO2 94 % 10/21/20 1320         No case tracking events are documented in the log.      Pain/Stephanie Score: Stephanie Score: 10 (10/21/2020  1:20 PM)

## 2020-10-21 NOTE — ANESTHESIA PROCEDURE NOTES
Arterial    Diagnosis: Atiral fibrilation     Patient location during procedure: done in OR  Procedure start time: 10/21/2020 8:22 AM  Timeout: 10/21/2020 8:20 AM  Procedure end time: 10/21/2020 8:27 AM    Staffing  Authorizing Provider: Chano Sandhu Jr., MD  Performing Provider: Zeinab Coronado MD    Anesthesiologist was present at the time of the procedure.    Preanesthetic Checklist  Completed: patient identified, site marked, surgical consent, pre-op evaluation, timeout performed, IV checked, risks and benefits discussed, monitors and equipment checked and anesthesia consent givenArterial  Skin Prep: chlorhexidine gluconate  Local Infiltration: lidocaine  Orientation: right  Location: radial  Catheter Size: 20 G  Catheter placement by Anatomical landmarks and Ultrasound guidance. Heme positive aspiration all ports.  Vessel Caliber: medium, patent, compressibility normal  Vascular Doppler:  not done  Needle advanced into vessel with real time Ultrasound guidance.Insertion Attempts: 2  Assessment  Dressing: secured with tape and tegaderm  Patient: Tolerated well

## 2020-10-21 NOTE — H&P
[facility] - Short Stay Cardiac Unit  Interventional Cardiology  H&P    Patient Name: Clint Torres  MRN: [id number]  Admission Date: 10/21/2020  Code Status: No Order   Attending Provider: Preet Saul MD   Primary Care Physician: Grzegorz Echavarria MD  Principal Problem:Paroxysmal atrial fibrillation    Patient information was obtained from patient, spouse/SO and past medical records.     Subjective:     HPI:  Clint Torres is a 74 y.o. male who presents for LAAO (Watchman)    Referring Provider: Dr. Sukhdeep Dimas MD  Neurologist: Dr. Kinjal Oh MD     Mr. Torres is a 75 y/o gentleman with a PMHx of CVA (2012), mild cognitive impairment, throat cancer s/p radiation, medication resistant Parkinson's, and Afib on Coumadin. He is currently followed by Dr. Oh who is planning on botox injections for sialorrhea that has been resistant to medical therapy. She would like for him to be off of Coumadin prior to proceeding with injections. He presents today for LAAO with a Watchman device. He would like to be able to discontinue OAC use in order to receive botox injections to help with progression of Parkinson's symptoms. [id number] 6 (9.8%/yr) and HAS-BLED of 3 (3.74%/yr). Currently on Coumadin for oral anticoagulation.      His Parkinson's was initially diagnosed ~2013, initial symptoms included R hand tremor and freezing of gait. He has followed with Neurology closely and has failed multiple medication treatments including sinemet, entacapone, primidone, rasagiline, neuro patch, amantadine, pramipexole, and topiratmate. He is currently on carbidopa/levodopa and donepezil.    NPO since yesterday.  Stopped Coumadin 3 days ago, has been continued on ASA 81 mg Daily.  Labs in process this AM.  Consents signed in clinic, scanned in media.    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    History  of head and neck radiation     PD (Parkinson's disease) 11/14/2017 2013 right hand tremor, freezing of gait Tried: sinemet, entacapone, primidone, rasagiline, neupro patch, amantadine, pramipexole    Stroke     Tonsil cancer      Past Surgical History:   Procedure Laterality Date    DISSECTION OF NECK      tonsillectomy       Review of patient's allergies indicates:  No Known Allergies    PTA Medications   Medication Sig    aspirin (ECOTRIN) 81 MG EC tablet Take 81 mg by mouth once daily.    gabapentin (NEURONTIN) 300 MG capsule     atropine 1% (ISOPTO ATROPINE) 1 % Drop Put 2 drops on tongue for sialorrhea    carbidopa-levodopa (PARCOPA)  mg per disintegrating tablet Take 1 tablet by mouth 3 (three) times daily.    docusate sodium (COLACE) 100 MG capsule Take 100 mg by mouth 2 (two) times daily.    donepezil (ARICEPT) 5 MG tablet Take 5 mg by mouth every evening.    donepeziL (ARICEPT) 5 MG tablet Take 2 tablets (10 mg total) by mouth every evening.    glimepiride (AMARYL) 2 MG tablet Take 2 mg by mouth 2 (two) times daily.    metFORMIN (GLUCOPHAGE-XR) 500 MG 24 hr tablet Take 500 mg by mouth once daily.    mirtazapine (REMERON) 15 MG tablet Take 1 tablet (15 mg total) by mouth every evening.    mirtazapine (REMERON) 15 MG tablet Take 15 mg by mouth every evening.    propranolol (INDERAL LA) 60 MG 24 hr capsule Take 60 mg by mouth once daily.    pyridoxine, vitamin B6, (VITAMIN B-6) 100 MG Tab Take 50 mg by mouth once daily.    sertraline (ZOLOFT) 50 MG tablet Take 50 mg by mouth once daily.    simvastatin (ZOCOR) 40 MG tablet Take 40 mg by mouth every evening.    warfarin (COUMADIN) 5 MG tablet Take 5 mg by mouth every Mon, Wed, Fri. Sun Tues Thurs Sat....half tablet     Family History     Problem Relation (Age of Onset)    Heart disease Brother    No Known Problems Mother, Father, Sister, Maternal Aunt, Maternal Uncle, Paternal Aunt, Paternal Uncle, Maternal Grandmother, Maternal  Grandfather, Paternal Grandmother, Paternal Grandfather        Tobacco Use    Smoking status: Never Smoker    Smokeless tobacco: Never Used   Substance and Sexual Activity    Alcohol use: Yes     Alcohol/week: 1.0 standard drinks     Types: 1 Cans of beer per week    Drug use: Never    Sexual activity: Not on file     Review of Systems   Constitution: Negative for chills, fever and weight loss.   HENT: Negative for sore throat and stridor.    Eyes: Negative for blurred vision, double vision and pain.   Cardiovascular: Negative for chest pain, claudication, dyspnea on exertion, near-syncope, orthopnea, palpitations, paroxysmal nocturnal dyspnea and syncope.   Respiratory: Negative for cough, shortness of breath, sputum production and wheezing.    Endocrine: Negative for polydipsia, polyphagia and polyuria.   Skin: Negative for rash.   Musculoskeletal: Negative for joint pain, joint swelling and myalgias.   Gastrointestinal: Positive for dysphagia. Negative for abdominal pain, constipation, diarrhea, heartburn, melena, nausea and vomiting.   Genitourinary: Negative for dysuria and hematuria.   Neurological: Positive for tremors. Negative for focal weakness and loss of balance.   Psychiatric/Behavioral: Negative for depression and memory loss.     Objective:     Vital Signs (Most Recent):  Temp: 97 °F (36.1 °C) (10/21/20 0634)  Pulse: 95 (10/21/20 0634)  Resp: 20 (10/21/20 0634)  BP: 112/76 (10/21/20 0634)  SpO2: 98 % (10/21/20 0634) Vital Signs (24h Range):  Temp:  [97 °F (36.1 °C)] 97 °F (36.1 °C)  Pulse:  [95] 95  Resp:  [20] 20  SpO2:  [98 %] 98 %  BP: (112)/(76) 112/76     Weight: 88.9 kg (196 lb)  Body mass index is 27.34 kg/m².  SpO2: 98 %     No intake or output data in the 24 hours ending 10/21/20 0644    Lines/Drains/Airways     None               Physical Exam   Constitutional: He is oriented to person, place, and time. He appears well-developed and well-nourished.   HENT:   Head: Normocephalic.   Eyes:  Pupils are equal, round, and reactive to light.   Neck: Normal range of motion. No JVD present.   Cardiovascular: Normal rate and regular rhythm.   No murmur heard.  Pulmonary/Chest: Effort normal and breath sounds normal. No respiratory distress. He has no wheezes. He has no rales.   Abdominal: Soft. Bowel sounds are normal. He exhibits no distension. There is no abdominal tenderness.   Musculoskeletal: Normal range of motion.         General: No edema.   Neurological: He is alert and oriented to person, place, and time.   Skin: Skin is warm and dry.     Significant Labs: In process this AM    Assessment and Plan:     * Paroxysmal atrial fibrillation  Clint Torres is a 74 y.o. male with a history of non valvular, Paroxysmal Atrial Fibrillation referred by Dr Sukhdeep Dimas for evaluation of LAAO with Watchman Device.    The patient has undergone the following work-up:   · [id number] 6 (9.8%/yr) and HAS-BLED of 3 (3.74%/yr)  · BOZENA measurements by CTA (Date:10/1/2020): Avg Diameter 28.0 mm, width 26.5-28.1 mm, depth 20.3-23.3 mm.   Current anticoagulation/antiplatelet regimen: Coumadin   Increased risk of falls: Yes   Clinically relevant bleeding event: no   Attempt at atrial fibrillation termination: no   Additional history/risk factors: Parkinson's, difficulty swallowing, needs Botox injections but cannot receive them due to Coumadin   Modified [place] Scale: : [place] Score of 1 - No significant disability despite symptoms; able to carry out all usual duties and activities    - Plan for LAAO with a 35 mm Watchman Flex device today given high bleeding and stroke risk  - R CFV Access    Diabetes mellitus with neurological manifestations  - Stable, on Metformin    Parkinsonism  - Followed by Dr. Oh  - Currently having difficulty with sialorrhea resistant to medical therapy, plan for eventual Botox injections once he is off Coumadin  - He will need cautious airway protection during his procedure    History of  stroke  - Hx of embolic CVA in 2012    Domingo Salazar MD  Interventional Cardiology   [facility] Short Stay Cardiac Unit

## 2020-10-21 NOTE — HPI
Clint Torres is a 74 y.o. male who presents for LAAO (Watchman)    Referring Provider: Dr. Sukhdeep Dimas MD  Neurologist: Dr. Kinjal Oh MD     Mr. Torres is a 73 y/o gentleman with a PMHx of CVA (2012), mild cognitive impairment, throat cancer s/p radiation, medication resistant Parkinson's, and Afib on Coumadin. He is currently followed by Dr. Oh who is planning on botox injections for sialorrhea that has been resistant to medical therapy. She would like for him to be off of Coumadin prior to proceeding with injections. He presents today for LAAO with a Watchman device. He would like to be able to discontinue OAC use in order to receive botox injections to help with progression of Parkinson's symptoms. [id number] 6 (9.8%/yr) and HAS-BLED of 3 (3.74%/yr). Currently on Coumadin for oral anticoagulation.      His Parkinson's was initially diagnosed ~2013, initial symptoms included R hand tremor and freezing of gait. He has followed with Neurology closely and has failed multiple medication treatments including sinemet, entacapone, primidone, rasagiline, neuro patch, amantadine, pramipexole, and topiratmate. He is currently on carbidopa/levodopa and donepezil.    NPO since yesterday.  Stopped Coumadin 3 days ago, has been continued on ASA 81 mg Daily.  Labs in process this AM.  Consents signed in clinic, scanned in media.

## 2020-10-21 NOTE — PROGRESS NOTES
Dr. Saul at bedside. 1000ml Normal Saline bolus infusing as ordered. Patient to go to Short Stay  if patient responds to bolus per Dr. Saul instruction.

## 2020-10-21 NOTE — SUBJECTIVE & OBJECTIVE
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      Current Outpatient Medications on File Prior to Encounter   Medication Sig    aspirin (ECOTRIN) 81 MG EC tablet Take 81 mg by mouth once daily.    gabapentin (NEURONTIN) 300 MG capsule     atropine 1% (ISOPTO ATROPINE) 1 % Drop Put 2 drops on tongue for sialorrhea    carbidopa-levodopa (PARCOPA)  mg per disintegrating tablet Take 1 tablet by mouth 3 (three) times daily.    docusate sodium (COLACE) 100 MG capsule Take 100 mg by mouth 2 (two) times daily.    donepezil (ARICEPT) 5 MG tablet Take 5 mg by mouth every evening.    donepeziL (ARICEPT) 5 MG tablet Take 2 tablets (10 mg total) by mouth every evening.    glimepiride (AMARYL) 2 MG tablet Take 2 mg by mouth 2 (two) times daily.    metFORMIN (GLUCOPHAGE-XR) 500 MG 24 hr tablet Take 500 mg by mouth once daily.    mirtazapine (REMERON) 15 MG tablet Take 1 tablet (15 mg total) by mouth every evening.    mirtazapine (REMERON) 15 MG tablet Take 15 mg by mouth every evening.    propranolol (INDERAL LA) 60 MG 24 hr capsule Take 60 mg by mouth once daily.    pyridoxine, vitamin B6, (VITAMIN B-6) 100 MG Tab Take 50 mg by mouth once daily.    sertraline (ZOLOFT) 50 MG tablet Take 50 mg by mouth once daily.    simvastatin (ZOCOR) 40 MG tablet Take 40 mg by mouth every evening.    warfarin (COUMADIN) 5 MG tablet  Take 5 mg by mouth every Mon, Wed, Fri. Sun Tues Thurs Sat....half tablet     Family History     Problem Relation (Age of Onset)    Heart disease Brother    No Known Problems Mother, Father, Sister, Maternal Aunt, Maternal Uncle, Paternal Aunt, Paternal Uncle, Maternal Grandmother, Maternal Grandfather, Paternal Grandmother, Paternal Grandfather        Tobacco Use    Smoking status: Never Smoker    Smokeless tobacco: Never Used   Substance and Sexual Activity    Alcohol use: Yes     Alcohol/week: 1.0 standard drinks     Types: 1 Cans of beer per week    Drug use: Never    Sexual activity: Not on file     Review of Systems   Constitution: Negative for chills, fever and weight loss.   HENT: Negative for sore throat and stridor.    Eyes: Negative for blurred vision, double vision and pain.   Cardiovascular: Negative for chest pain, claudication, dyspnea on exertion, irregular heartbeat, near-syncope, orthopnea, palpitations, paroxysmal nocturnal dyspnea and syncope.   Respiratory: Negative for cough, shortness of breath, sputum production and wheezing.    Endocrine: Negative for polydipsia, polyphagia and polyuria.   Skin: Negative for rash.   Musculoskeletal: Negative for joint pain, joint swelling and myalgias.   Gastrointestinal: Positive for dysphagia. Negative for abdominal pain, constipation, diarrhea, heartburn, melena, nausea and vomiting.   Genitourinary: Negative for dysuria and hematuria.   Neurological: Positive for tremors. Negative for focal weakness, headaches, loss of balance and weakness.   Psychiatric/Behavioral: Negative for depression and memory loss.     Objective:     Vital Signs (Most Recent):  Temp: 97 °F (36.1 °C) (10/21/20 0634)  Pulse: 95 (10/21/20 0634)  Resp: 20 (10/21/20 0634)  BP: 112/76 (10/21/20 0634)  SpO2: 98 % (10/21/20 0634) Vital Signs (24h Range):  Temp:  [97 °F (36.1 °C)] 97 °F (36.1 °C)  Pulse:  [95] 95  Resp:  [20] 20  SpO2:  [98 %] 98 %  BP: (112)/(76) 112/76     Weight:  88.9 kg (196 lb)  Body mass index is 27.34 kg/m².    SpO2: 98 %       No intake or output data in the 24 hours ending 10/21/20 0659    Lines/Drains/Airways     Peripheral Intravenous Line                 Peripheral IV - Single Lumen 10/21/20 0650 18 G Left Antecubital less than 1 day                Physical Exam   Constitutional: He is oriented to person, place, and time. He appears well-developed and well-nourished.   HENT:   Head: Normocephalic.   Eyes: Pupils are equal, round, and reactive to light.   Neck: Normal range of motion. No JVD present.   Cardiovascular: Normal rate and regular rhythm.   No murmur heard.  Pulmonary/Chest: Effort normal and breath sounds normal. No respiratory distress. He has no wheezes. He has no rales.   Abdominal: Soft. Bowel sounds are normal. He exhibits no distension. There is no abdominal tenderness.   Musculoskeletal: Normal range of motion.         General: No edema.   Neurological: He is alert and oriented to person, place, and time.   Skin: Skin is warm and dry.       Significant Labs: CMP No results for input(s): NA, K, CL, CO2, GLU, BUN, CREATININE, CALCIUM, PROT, ALBUMIN, BILITOT, ALKPHOS, AST, ALT, ANIONGAP, ESTGFRAFRICA, EGFRNONAA in the last 48 hours. and CBC No results for input(s): WBC, HGB, HCT, PLT in the last 48 hours.    Significant Imaging: All pertinent imaging reviewed

## 2020-10-21 NOTE — Clinical Note
55 ml injected throughout the case. 145 mL total wasted during the case. 200 mL total used in the case.

## 2020-10-21 NOTE — ASSESSMENT & PLAN NOTE
- Followed by Dr. Oh  - Currently having difficulty with sialorrhea resistant to medical therapy, plan for eventual Botox injections once he is off Coumadin  - He will need cautious airway protection during his procedure

## 2020-10-21 NOTE — PROGRESS NOTES
Patient remains drowsy, but easily arousable. Reports feeling nice and relaxed. Follows command and oriented.Denies any distress. Tolerating sips of water fine. No tremors at present. SPB 90-96 per arterial line reading.Cuff SBP 80's. Martin paged to upate as to patient's BP readings; awaiting response.

## 2020-10-21 NOTE — DISCHARGE SUMMARY
Discharge Summary  Interventional Cardiology      Admit Date: 10/21/2020    Discharge Date:  10/21/2020    Attending Physician: Preet Saul MD    Discharge Physician: Domingo Salazar MD    Principal Diagnoses: Paroxysmal atrial fibrillation  Indication for Admission: Left atrial appendage closure device (N/A)    Discharged Condition: Good    Hospital Course:   Patient presented for outpatient LAAO device implantation which went without complication. Patient underwent successful BOZENA occlusion with a 31 mm Watchman Flex device. See full cath report in Epic for details. Hemostasis of patient's R CFV access site was achieved with Perclosure. Patient was monitored per post-cath protocol, and his R groin access site was c/d/i with no hematoma. Patient was able to ambulate without difficulty. He was feeling well and anticipating discharge home today.     Outpatient Plan:  - Resume ASA/Coumadin therapy for 45 days.   - Clinic follow up and PERLA in 45 days  - If no nona-device leak in 45 days, then discontinue Coumadin and start Plavix  - Continue ASA indefinitely. Discontinue Plavix after another 4.5 months.    Diet: Cardiac diet    Activity: Ad cayetano, wound care instructions provided    Disposition: Home or Self Care    Discharge Medications:      Medication List      CONTINUE taking these medications    aspirin 81 MG EC tablet  Commonly known as: ECOTRIN     atropine 1% 1 % Drop  Commonly known as: ISOPTO ATROPINE  Put 2 drops on tongue for sialorrhea     docusate sodium 100 MG capsule  Commonly known as: COLACE     * donepeziL 5 MG tablet  Commonly known as: ARICEPT     * donepeziL 5 MG tablet  Commonly known as: ARICEPT  Take 2 tablets (10 mg total) by mouth every evening.     gabapentin 300 MG capsule  Commonly known as: NEURONTIN     glimepiride 2 MG tablet  Commonly known as: AMARYL     metFORMIN 500 MG ER 24hr tablet  Commonly known as: GLUCOPHAGE-XR     * mirtazapine 15 MG tablet  Commonly known as: REMERON      * mirtazapine 15 MG tablet  Commonly known as: REMERON  Take 1 tablet (15 mg total) by mouth every evening.     propranoloL 60 MG 24 hr capsule  Commonly known as: INDERAL LA     sertraline 50 MG tablet  Commonly known as: ZOLOFT     simvastatin 40 MG tablet  Commonly known as: ZOCOR     VITAMIN B-6 100 MG Tab  Generic drug: pyridoxine (vitamin B6)     warfarin 5 MG tablet  Commonly known as: COUMADIN         * This list has 4 medication(s) that are the same as other medications prescribed for you. Read the directions carefully, and ask your doctor or other care provider to review them with you.            ASK your doctor about these medications    carbidopa-levodopa  mg per disintegrating tablet  Commonly known as: PARCOPA  Take 1 tablet by mouth 3 (three) times daily.          Follow Up:  Follow-up Information     CARDIOLOGY, ECHO In 45 days.    Specialty: Cardiology

## 2020-10-21 NOTE — TRANSFER OF CARE
"Anesthesia Transfer of Care Note    Patient: Clint Torres    Procedure(s) Performed: Procedure(s) (LRB):  Left atrial appendage closure device (N/A)    Patient location: PACU    Anesthesia Type: general    Transport from OR: Transported from OR on 2-3 L/min O2 by NC with adequate spontaneous ventilation    Post pain: adequate analgesia    Post assessment: no apparent anesthetic complications    Post vital signs: stable    Level of consciousness: awake    Nausea/Vomiting: no nausea/vomiting    Complications: none    Transfer of care protocol was followed      Last vitals:   Visit Vitals  /76 (BP Location: Right arm, Patient Position: Lying)   Pulse 95   Temp 36.1 °C (97 °F) (Oral)   Resp 20   Ht 5' 11" (1.803 m)   Wt 88.9 kg (196 lb)   SpO2 98%   BMI 27.34 kg/m²     "

## 2020-10-22 LAB
POC ACTIVATED CLOTTING TIME K: 131 SEC (ref 74–137)
POC ACTIVATED CLOTTING TIME K: 318 SEC (ref 74–137)
SAMPLE: ABNORMAL
SAMPLE: NORMAL

## 2020-10-23 DIAGNOSIS — I48.19 PERSISTENT ATRIAL FIBRILLATION: ICD-10-CM

## 2020-10-23 DIAGNOSIS — Z95.818 PRESENCE OF WATCHMAN LEFT ATRIAL APPENDAGE CLOSURE DEVICE: ICD-10-CM

## 2020-10-23 DIAGNOSIS — Z01.812 PRE-PROCEDURE LAB EXAM: Primary | ICD-10-CM

## 2020-10-26 ENCOUNTER — TELEPHONE (OUTPATIENT)
Dept: CARDIOLOGY | Facility: CLINIC | Age: 75
End: 2020-10-26

## 2020-10-26 DIAGNOSIS — R94.31 ABNORMAL ELECTROCARDIOGRAM (ECG) (EKG): ICD-10-CM

## 2020-10-26 DIAGNOSIS — I70.0 ATHEROSCLEROSIS OF AORTA: ICD-10-CM

## 2020-10-26 DIAGNOSIS — Z95.818 PRESENCE OF WATCHMAN LEFT ATRIAL APPENDAGE CLOSURE DEVICE: Primary | ICD-10-CM

## 2020-10-26 NOTE — TELEPHONE ENCOUNTER
"You have been scheduled for a procedure in the echo lab on Monday, December 7, 2020.     Please report to the Cardiology Waiting Area on the Third floor of the hospital and check in at 11 AM.     You will then be taken to the SSCU (Short Stay Cardiac Unit) and prepared for your procedure. Please be aware that this is not the time of your procedure but the time you are to arrive. The procedures are scheduled on an hourly basis; however, emergency cases take precedence over all other cases.     You may not have anything to eat or drink after midnight the night before your test. You may take your regular morning medications with water.    The procedure will take 1-2 hours to perform. After the procedure, you will return to SSCU on the third floor of the hospital. Your family may remain in the room with you during this time.     You will be discharged home that same afternoon. You must have someone to drive you home.  Your doctor will determine, based on your progress, the time of your discharge. The results of your procedure will be discussed with you before you are discharged. Any further testing or procedures will be scheduled for you either before you leave or you will be called with these appointments.       If you should have any questions, concerns, or need to change the date of your procedure, please call  CHERYLE Murray @ [phone]",," Jenny RN @ [phone]    Special Instructions:  none        THE ABOVE INSTRUCTIONS WERE GIVEN TO THE PATIENT VERBALLY AND THEY VERBALIZED UNDERSTANDING.  THEY DO NOT REQUIRE ANY SPECIAL NEEDS AND DO NOT HAVE ANY LEARNING BARRIERS.          Directions for Reporting to Cardiology Waiting Area in the Hospital  If you park in the Parking Garage:  Take elevators to the1st floor of the parking garage.  Continue past the gift shop, coffee shop, and piano.  Take a right and go to the gold elevators. (Elevator B)  Take the elevator to the 3rd floor.  Follow the arrow on the sign on " the wall that says Cath Lab Registration/EP Lab Registration.  Follow the long hallway all the way around until you come to a big open area.  This is the registration area.  Check in at Reception Desk.    OR    If family is dropping you off:  Have them drop you off at the front of the Hospital under the green overhang.  Enter through the doors and take a right.  Take the E elevators to the 3rd floor Cardiology Waiting Area.  Check in at the Reception Desk in the waiting room.

## 2020-12-01 DIAGNOSIS — K11.7 SIALORRHEA: Primary | ICD-10-CM

## 2020-12-07 ENCOUNTER — OFFICE VISIT (OUTPATIENT)
Dept: CARDIOLOGY | Facility: CLINIC | Age: 75
End: 2020-12-07
Payer: MEDICARE

## 2020-12-07 ENCOUNTER — HOSPITAL ENCOUNTER (OUTPATIENT)
Dept: CARDIOLOGY | Facility: HOSPITAL | Age: 75
Discharge: HOME | End: 2020-12-07
Attending: INTERNAL MEDICINE
Payer: MEDICARE

## 2020-12-07 ENCOUNTER — ANESTHESIA (OUTPATIENT)
Dept: MEDSURG UNIT | Facility: HOSPITAL | Age: 75
End: 2020-12-07
Payer: MEDICARE

## 2020-12-07 ENCOUNTER — LAB VISIT (OUTPATIENT)
Dept: INTERNAL MEDICINE | Facility: CLINIC | Age: 75
End: 2020-12-07
Payer: MEDICARE

## 2020-12-07 ENCOUNTER — ANESTHESIA EVENT (OUTPATIENT)
Dept: MEDSURG UNIT | Facility: HOSPITAL | Age: 75
End: 2020-12-07
Payer: MEDICARE

## 2020-12-07 ENCOUNTER — HOSPITAL ENCOUNTER (OUTPATIENT)
Facility: HOSPITAL | Age: 75
Discharge: HOME | End: 2020-12-07
Attending: INTERNAL MEDICINE | Admitting: INTERNAL MEDICINE
Payer: MEDICARE

## 2020-12-07 VITALS
TEMPERATURE: 97 F | DIASTOLIC BLOOD PRESSURE: 74 MMHG | HEART RATE: 82 BPM | SYSTOLIC BLOOD PRESSURE: 110 MMHG | OXYGEN SATURATION: 96 % | HEIGHT: 72 IN | BODY MASS INDEX: 26.55 KG/M2 | WEIGHT: 196 LBS | RESPIRATION RATE: 12 BRPM

## 2020-12-07 VITALS
SYSTOLIC BLOOD PRESSURE: 104 MMHG | WEIGHT: 195 LBS | DIASTOLIC BLOOD PRESSURE: 71 MMHG | HEIGHT: 72 IN | BODY MASS INDEX: 26.41 KG/M2

## 2020-12-07 VITALS
HEIGHT: 71 IN | DIASTOLIC BLOOD PRESSURE: 67 MMHG | WEIGHT: 196 LBS | BODY MASS INDEX: 27.44 KG/M2 | SYSTOLIC BLOOD PRESSURE: 101 MMHG | HEART RATE: 80 BPM | OXYGEN SATURATION: 97 %

## 2020-12-07 DIAGNOSIS — Z01.812 PRE-PROCEDURE LAB EXAM: ICD-10-CM

## 2020-12-07 DIAGNOSIS — G20.C PARKINSONISM, UNSPECIFIED PARKINSONISM TYPE: ICD-10-CM

## 2020-12-07 DIAGNOSIS — Z95.818 PRESENCE OF WATCHMAN LEFT ATRIAL APPENDAGE CLOSURE DEVICE: ICD-10-CM

## 2020-12-07 DIAGNOSIS — I70.0 ATHEROSCLEROSIS OF AORTA: ICD-10-CM

## 2020-12-07 DIAGNOSIS — Z86.73 HISTORY OF STROKE: ICD-10-CM

## 2020-12-07 DIAGNOSIS — I48.11 LONGSTANDING PERSISTENT ATRIAL FIBRILLATION: ICD-10-CM

## 2020-12-07 DIAGNOSIS — I48.0 PAROXYSMAL ATRIAL FIBRILLATION: Primary | ICD-10-CM

## 2020-12-07 DIAGNOSIS — E11.42 TYPE 2 DIABETES MELLITUS WITH DIABETIC POLYNEUROPATHY, WITHOUT LONG-TERM CURRENT USE OF INSULIN: ICD-10-CM

## 2020-12-07 LAB
BSA FOR ECHO PROCEDURE: 2.11 M2
INR PPP: 1.1 (ref 0.8–1.2)
PROTHROMBIN TIME: 12.4 SEC (ref 9–12.5)
PROX AORTA: 4 CM
SARS-COV-2 RDRP RESP QL NAA+PROBE: NEGATIVE
SINUS: 3.9 CM
STJ: 3.3 CM

## 2020-12-07 PROCEDURE — D9220A PRA ANESTHESIA: Mod: CRNA,,, | Performed by: NURSE ANESTHETIST, CERTIFIED REGISTERED

## 2020-12-07 PROCEDURE — 93325 DOPPLER ECHO COLOR FLOW MAPG: CPT | Mod: 26,,, | Performed by: INTERNAL MEDICINE

## 2020-12-07 PROCEDURE — 93312 TRANSESOPHAGEAL ECHO (TEE) (CUPID ONLY): ICD-10-PCS | Mod: 26,,, | Performed by: INTERNAL MEDICINE

## 2020-12-07 PROCEDURE — U0003 INFECTIOUS AGENT DETECTION BY NUCLEIC ACID (DNA OR RNA); SEVERE ACUTE RESPIRATORY SYNDROME CORONAVIRUS 2 (SARS-COV-2) (CORONAVIRUS DISEASE [COVID-19]), AMPLIFIED PROBE TECHNIQUE, MAKING USE OF HIGH THROUGHPUT TECHNOLOGIES AS DESCRIBED BY CMS-2020-01-R: HCPCS

## 2020-12-07 PROCEDURE — 99213 PR OFFICE/OUTPT VISIT, EST, LEVL III, 20-29 MIN: ICD-10-PCS | Mod: S$PBB,,, | Performed by: INTERNAL MEDICINE

## 2020-12-07 PROCEDURE — 93320 DOPPLER ECHO COMPLETE: CPT | Mod: 26,,, | Performed by: INTERNAL MEDICINE

## 2020-12-07 PROCEDURE — 99999 PR PBB SHADOW E&M-EST. PATIENT-LVL V: ICD-10-PCS | Mod: PBBFAC,,, | Performed by: INTERNAL MEDICINE

## 2020-12-07 PROCEDURE — 93325 TRANSESOPHAGEAL ECHO (TEE) (CUPID ONLY): ICD-10-PCS | Mod: 26,,, | Performed by: INTERNAL MEDICINE

## 2020-12-07 PROCEDURE — 99999 PR PBB SHADOW E&M-EST. PATIENT-LVL V: CPT | Mod: PBBFAC,,, | Performed by: INTERNAL MEDICINE

## 2020-12-07 PROCEDURE — 93325 DOPPLER ECHO COLOR FLOW MAPG: CPT

## 2020-12-07 PROCEDURE — D9220A PRA ANESTHESIA: Mod: ANES,,, | Performed by: STUDENT IN AN ORGANIZED HEALTH CARE EDUCATION/TRAINING PROGRAM

## 2020-12-07 PROCEDURE — 25000003 PHARM REV CODE 250: Performed by: STUDENT IN AN ORGANIZED HEALTH CARE EDUCATION/TRAINING PROGRAM

## 2020-12-07 PROCEDURE — 99215 OFFICE O/P EST HI 40 MIN: CPT | Mod: PBBFAC,25 | Performed by: INTERNAL MEDICINE

## 2020-12-07 PROCEDURE — 63600175 PHARM REV CODE 636 W HCPCS: Performed by: STUDENT IN AN ORGANIZED HEALTH CARE EDUCATION/TRAINING PROGRAM

## 2020-12-07 PROCEDURE — 93320 TRANSESOPHAGEAL ECHO (TEE) (CUPID ONLY): ICD-10-PCS | Mod: 26,,, | Performed by: INTERNAL MEDICINE

## 2020-12-07 PROCEDURE — U0002 COVID-19 LAB TEST NON-CDC: HCPCS

## 2020-12-07 PROCEDURE — 37000008 HC ANESTHESIA 1ST 15 MINUTES

## 2020-12-07 PROCEDURE — 37000009 HC ANESTHESIA EA ADD 15 MINS

## 2020-12-07 PROCEDURE — 85610 PROTHROMBIN TIME: CPT

## 2020-12-07 PROCEDURE — D9220A PRA ANESTHESIA: ICD-10-PCS | Mod: ANES,,, | Performed by: STUDENT IN AN ORGANIZED HEALTH CARE EDUCATION/TRAINING PROGRAM

## 2020-12-07 PROCEDURE — 99213 OFFICE O/P EST LOW 20 MIN: CPT | Mod: S$PBB,,, | Performed by: INTERNAL MEDICINE

## 2020-12-07 PROCEDURE — 93312 ECHO TRANSESOPHAGEAL: CPT | Mod: 26,,, | Performed by: INTERNAL MEDICINE

## 2020-12-07 PROCEDURE — D9220A PRA ANESTHESIA: ICD-10-PCS | Mod: CRNA,,, | Performed by: NURSE ANESTHETIST, CERTIFIED REGISTERED

## 2020-12-07 RX ORDER — SODIUM CHLORIDE 0.9 % (FLUSH) 0.9 %
10 SYRINGE (ML) INJECTION
Status: DISCONTINUED | OUTPATIENT
Start: 2020-12-07 | End: 2020-12-07 | Stop reason: HOSPADM

## 2020-12-07 RX ORDER — PROPOFOL 10 MG/ML
VIAL (ML) INTRAVENOUS CONTINUOUS PRN
Status: DISCONTINUED | OUTPATIENT
Start: 2020-12-07 | End: 2020-12-07

## 2020-12-07 RX ORDER — LIDOCAINE HYDROCHLORIDE 20 MG/ML
INJECTION, SOLUTION EPIDURAL; INFILTRATION; INTRACAUDAL; PERINEURAL
Status: DISCONTINUED | OUTPATIENT
Start: 2020-12-07 | End: 2020-12-07

## 2020-12-07 RX ORDER — PHENYLEPHRINE HCL IN 0.9% NACL 1 MG/10 ML
SYRINGE (ML) INTRAVENOUS
Status: DISCONTINUED | OUTPATIENT
Start: 2020-12-07 | End: 2020-12-07

## 2020-12-07 RX ORDER — CLOPIDOGREL BISULFATE 75 MG/1
75 TABLET ORAL DAILY
Qty: 30 TABLET | Refills: 5 | Status: SHIPPED | OUTPATIENT
Start: 2020-12-07 | End: 2021-06-05

## 2020-12-07 RX ORDER — PROPOFOL 10 MG/ML
VIAL (ML) INTRAVENOUS
Status: DISCONTINUED | OUTPATIENT
Start: 2020-12-07 | End: 2020-12-07

## 2020-12-07 RX ORDER — DEXMEDETOMIDINE HYDROCHLORIDE 100 UG/ML
INJECTION, SOLUTION INTRAVENOUS
Status: DISCONTINUED | OUTPATIENT
Start: 2020-12-07 | End: 2020-12-07

## 2020-12-07 RX ADMIN — SODIUM CHLORIDE, SODIUM GLUCONATE, SODIUM ACETATE, POTASSIUM CHLORIDE, MAGNESIUM CHLORIDE, SODIUM PHOSPHATE, DIBASIC, AND POTASSIUM PHOSPHATE: .53; .5; .37; .037; .03; .012; .00082 INJECTION, SOLUTION INTRAVENOUS at 02:12

## 2020-12-07 RX ADMIN — DEXMEDETOMIDINE HYDROCHLORIDE 8 MCG: 100 INJECTION, SOLUTION INTRAVENOUS at 02:12

## 2020-12-07 RX ADMIN — PROPOFOL 100 MCG/KG/MIN: 10 INJECTION, EMULSION INTRAVENOUS at 02:12

## 2020-12-07 RX ADMIN — Medication 100 MCG: at 02:12

## 2020-12-07 RX ADMIN — LIDOCAINE HYDROCHLORIDE 100 MG: 20 INJECTION, SOLUTION EPIDURAL; INFILTRATION; INTRACAUDAL at 02:12

## 2020-12-07 RX ADMIN — GLYCOPYRROLATE 200 MCG: 0.2 INJECTION INTRAMUSCULAR; INTRAVENOUS at 02:12

## 2020-12-07 RX ADMIN — PROPOFOL 50 MG: 10 INJECTION, EMULSION INTRAVENOUS at 02:12

## 2020-12-07 NOTE — ASSESSMENT & PLAN NOTE
1. PERLA for evaluation of post Watchman  -No absolute contraindications of esophageal stricture, tumor, perforation, laceration,or diverticulum and/or active GI bleed  -The risks, benefits & alternatives of the procedure were explained to the patient.   -The risks of transesophageal echo include but are not limited to:  Dental trauma, esophageal trauma/perforation, bleeding, laryngospasm/brochospasm, aspiration, sore throat/hoarseness, & dislodgement of the endotracheal tube/nasogastric tube (where applicable).    -The risks of moderate sedation include hypotension, respiratory depression, arrhythmias, bronchospasm, & death.    -Informed consent was obtained. The patient is agreeable to proceed with the procedure and all questions and concerns addressed.    Case discussed with an attending in echocardiography lab.     Further recommendations per attending addendum

## 2020-12-07 NOTE — ANESTHESIA POSTPROCEDURE EVALUATION
Anesthesia Post Evaluation    Patient: Clint Torres    Procedure(s) Performed: Procedure(s) (LRB):  ECHOCARDIOGRAM,TRANSESOPHAGEAL (N/A)    Final Anesthesia Type: general    Patient location during evaluation: PACU  Patient participation: Yes- Able to Participate  Level of consciousness: awake and alert  Post-procedure vital signs: reviewed and stable  Pain management: adequate  Airway patency: patent    PONV status at discharge: No PONV  Anesthetic complications: no      Cardiovascular status: hemodynamically stable  Respiratory status: unassisted  Hydration status: euvolemic  Follow-up not needed.          Vitals Value Taken Time   BP 85/68 12/07/20 1602   Temp 36.2 °C (97.1 °F) 12/07/20 1455   Pulse 86 12/07/20 1604   Resp 3 12/07/20 1547   SpO2 100 % 12/07/20 1604   Vitals shown include unvalidated device data.      No case tracking events are documented in the log.      Pain/Stephanie Score: Stephanie Score: 9 (12/7/2020  3:45 PM)

## 2020-12-07 NOTE — ASSESSMENT & PLAN NOTE
- Currently in Afib on exam  - s/p 31 mm Watchman FLX on 10/21/2020  - Scheduled for PERLA this afternoon; if PERLA confirms successful LAAO, may stop Coumadin and start Plavix  - Continue ASA and Plavix for 6 months total (until 3/21/2021), then ASA 81 mg only  - Plavix may be interrupted prior to 3/21/2021 in order to receive Botox injections for sialorrhea/Parkinson's  - Follow up with Dr. Dimas

## 2020-12-07 NOTE — DISCHARGE INSTRUCTIONS
Transesophageal Echocardiography (PERLA)      Transesophageal echocardiography (PERLA) is a test done to record images of your heart with a probe inside your esophagus. These images help your healthcare provider find and treat problems such as infection, disease, or defects in your hearts function, walls or valves. This test may be done when a chest echocardiogram (transthoracic) does not give your provider enough information.  Before your test  · Tell your provider about all the medicines you take. Ask if its OK to take them before the test.  · Dont eat or drink for 6 to 8 hours before the test. This includes water.  · Tell your healthcare provider if you have ulcers, a hiatal hernia, or problems swallowing. Also report a history of narrowing of the esophagus, or any other previous gastrointestinal problems.  Also, let him or her know of any allergies to medicines or sedatives.  · Also let your provider know if you have dental implants or dentures that should be removed before the test.  · Arrange to have someone drive you home after the exam.  During your PERLA  · When you arrive for your PERLA, you will change into a hospital gown, and then be taken to the testing room.  · Your provider will spray your throat with a numbing medicine. You may be given a medicine through an IV (intravenous) in your arm to help you relax. You may also be given oxygen. Then youll be asked to lie on your left side.  · The healthcare provider gently inserts the small, lubricated probe into your mouth. As you swallow, he or she will slowly guide the tube into your esophagus.  · You may feel the healthcare provider moving the probe, but it shouldnt hurt or interfere with your breathing. A nurse checks your heart rate, blood pressure, and breathing. The test usually takes 20 to 40 minutes.  · The nurse or assistant will suction any saliva out of your mouth, similar to when you have a dental cleaning.  After the test  · Tell your healthcare  provider about any pain, or if you cough up or vomit blood, or have trouble swallowing.  · You can eat and drink again when your throat is no longer numb.  · Do not drive a car or run heavy machinery for at least 24 hours after getting sedation. After 24 hours you can return to normal activity unless your healthcare provider tells you otherwise.  · Be sure to keep your follow-up appointment to go over the results with your healthcare provider.  · Your next appointment is: ____________________  Date Last Reviewed: 12/1/2016  © 3522-9433 [employer]. [address] [place] [address], [place] [address]. All rights reserved. This information is not intended as a substitute for professional medical care. Always follow your healthcare professional's instructions.          - Stop warfarin  - Start Plavix for 4.5 months  - Continue aspirin

## 2020-12-07 NOTE — HOSPITAL COURSE
Patient underwent PERLA for post Watchman evaluation. No device-related thrombus or nona leak observed. Discussed with IC. Patient will stop warfarin. Continue aspirin and start Plavix.

## 2020-12-07 NOTE — ANESTHESIA PREPROCEDURE EVALUATION
12/07/2020  Clint Torres is a 75 y.o., male.  Pre-operative evaluation for Procedure(s) (LRB):  ECHOCARDIOGRAM,TRANSESOPHAGEAL (N/A)    Clint Torres is a 75 y.o. 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      Current Outpatient Medications on File Prior to Encounter   Medication Sig Dispense Refill    aspirin (ECOTRIN) 81 MG EC tablet Take 81 mg by mouth once daily.      docusate sodium (COLACE) 100 MG capsule Take 100 mg by mouth 2 (two) times daily.      donepeziL (ARICEPT) 5 MG tablet Take 2 tablets (10 mg total) by mouth every evening. 60 tablet 11    gabapentin (NEURONTIN) 300 MG capsule Take 300 mg by mouth once daily.       sertraline (ZOLOFT) 50 MG tablet Take 50 mg by mouth once daily.      atropine 1% (ISOPTO ATROPINE) 1 % Drop Put 2 drops on tongue for sialorrhea 1 Bottle 5    carbidopa-levodopa (PARCOPA)  mg per disintegrating tablet Take 1 tablet by mouth 3 (three) times daily. 90 tablet 11    donepezil (ARICEPT) 5 MG tablet Take 5 mg by mouth every evening.      glimepiride (AMARYL) 2 MG tablet Take 2 mg by mouth 2 (two) times  daily.      metFORMIN (GLUCOPHAGE-XR) 500 MG 24 hr tablet Take 500 mg by mouth once daily.      mirtazapine (REMERON) 15 MG tablet Take 1 tablet (15 mg total) by mouth every evening. 90 tablet 2    mirtazapine (REMERON) 15 MG tablet Take 15 mg by mouth every evening.      propranolol (INDERAL LA) 60 MG 24 hr capsule Take 60 mg by mouth once daily.      pyridoxine, vitamin B6, (VITAMIN B-6) 100 MG Tab Take 50 mg by mouth once daily.      simvastatin (ZOCOR) 40 MG tablet Take 40 mg by mouth every evening.         Past Surgical History:   Procedure Laterality Date    CLOSURE OF LEFT ATRIAL APPENDAGE USING DEVICE N/A 10/21/2020    Procedure: Left atrial appendage closure device;  Surgeon: Preet Saul MD;  Location: [facility] CATH LAB;  Service: Cardiology;  Laterality: N/A;    DISSECTION OF NECK      tonsillectomy         Social History     Socioeconomic History    Marital status:      Spouse name: Not on file    Number of children: Not on file    Years of education: Not on file    Highest education level: Not on file   Occupational History    Not on file   Social Needs    Financial resource strain: Not on file    Food insecurity     Worry: Not on file     Inability: Not on file    Transportation needs     Medical: Not on file     Non-medical: Not on file   Tobacco Use    Smoking status: Never Smoker    Smokeless tobacco: Never Used   Substance and Sexual Activity    Alcohol use: Yes     Alcohol/week: 1.0 standard drinks     Types: 1 Cans of beer per week    Drug use: Never    Sexual activity: Not on file   Lifestyle    Physical activity     Days per week: Not on file     Minutes per session: Not on file    Stress: Not on file   Relationships    Social connections     Talks on phone: Not on file     Gets together: Not on file     Attends L[employer]-day service: Not on file     Active member of club or organization: Not on file     Attends meetings of clubs or organizations: Not on file      Relationship status: Not on file   Other Topics Concern    Not on file   Social History Narrative    Not on file         CBC:   Recent Labs     20  0851   WBC 6.78   RBC 5.69   HGB 17.1   HCT 53.2      MCV 94   MCH 30.1   MCHC 32.1       CMP:   Recent Labs     20  0851   CREATININE 0.8       INR  No results for input(s): PT, INR, PROTIME, APTT in the last 72 hours.        Diagnostic Studies:      EKD Echo:    10/21/20  · PERLA for watchman placememt 3D BOZENA measurment 2.1 x 2. cm with largest depth 3.8 cm. SEC in BOZENA. no clot present. contrast used.  · Placement of a 31 watchman FLEX device with 20% compression achieved. There is no peridevice leak and no pericardial effusion at the end of the procedure. presence of iatrogenic ASD with left to right flow.  · With mildly decreased systolic function. The estimated ejection fraction is 40%.  · Normal right ventricular systolic function.  · Mild mitral regurgitation.  · No plaque present.  No results found for this or any previous visit.      Anesthesia Evaluation    I have reviewed the Patient Summary Reports.    I have reviewed the Nursing Notes. I have reviewed the NPO Status.      Review of Systems      Physical Exam  General:  Well nourished    Airway/Jaw/Neck:  Airway Findings: Mouth Opening: Normal Tongue: Normal  General Airway Assessment: Adult  Mallampati: II  TM Distance: Normal, at least 6 cm      Dental:  Dental Findings: In tact        Mental Status:  Mental Status Findings:  Cooperative         Anesthesia Plan  Type of Anesthesia, risks & benefits discussed:  Anesthesia Type:  general  Patient's Preference:   Intra-op Monitoring Plan: standard ASA monitors  Intra-op Monitoring Plan Comments:   Post Op Pain Control Plan: multimodal analgesia  Post Op Pain Control Plan Comments:   Induction:   IV  Beta Blocker:  Patient is on a Beta-Blocker and has received one dose within the past 24 hours (No further documentation required).        Informed Consent: Patient understands risks and agrees with Anesthesia plan.  Questions answered. Anesthesia consent signed with patient.  ASA Score: 3     Day of Surgery Review of History & Physical: I have interviewed and examined the patient. I have reviewed the patient's H&P dated:            Ready For Surgery From Anesthesia Perspective.

## 2020-12-07 NOTE — PROGRESS NOTES
Interventional Cardiology Clinic Note  Reason for Visit: 45-day Watchman Follow-up  Referring Provider: Dr. Sukhdeep Dimas MD  Neurologist: Dr. Kinjal Oh MD    HPI:   Clint Torres is a 75 y.o. male who presents for Paroxysmal atrial fibrillation    Mr. Torres is a 74 y/o gentleman with a PMHx of CVA (2012), mild cognitive impairment, throat cancer s/p radiation, medication resistant Parkinson's, and Afib on Coumadin. He is currently followed by Dr. Oh who is planning on botox injections for sialorrhea that has been resistant to medical therapy. She would like for him to be off of Coumadin prior to proceeding with injections. He underwent successful LAAO with a 31 mm Watchman FLX device on 10/21/2020 and now presents for 45-day follow-up.    Since discharge patient has been feeling well. He has been taking ASA and Coumadin with no interruption. He denies any chest pain, dyspnea, syncope, PND, orthopnea, or LE edema.    Patient has a PERLA scheduled this afternoon.    ROS:    Constitution: Negative for fever, chills, weight loss or gain.   HENT: Negative for sore throat, rhinorrhea, or headache.  Eyes: Negative for blurred or double vision.   Cardiovascular: See above  Pulmonary: Negative for SOB   Gastrointestinal: Negative for abdominal pain, nausea, vomiting, or diarrhea.   : Negative for dysuria.   Neurological: Negative for focal weakness or sensory changes.  PMH:     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    History of head and neck radiation     PD (Parkinson's disease) 11/14/2017 2013 right hand tremor, freezing of gait Tried: sinemet, entacapone, primidone, rasagiline, neupro patch, amantadine, pramipexole    Stroke     Tonsil cancer      Past Surgical History:   Procedure Laterality Date    CLOSURE OF LEFT ATRIAL APPENDAGE USING DEVICE N/A 10/21/2020    Procedure: Left  atrial appendage closure device;  Surgeon: Preet Saul MD;  Location: [facility] CATH LAB;  Service: Cardiology;  Laterality: N/A;    DISSECTION OF NECK      tonsillectomy       Allergies:   Review of patient's allergies indicates:  No Known Allergies  Medications:     Current Outpatient Medications on File Prior to Visit   Medication Sig Dispense Refill    aspirin (ECOTRIN) 81 MG EC tablet Take 81 mg by mouth once daily.      atropine 1% (ISOPTO ATROPINE) 1 % Drop Put 2 drops on tongue for sialorrhea 1 Bottle 5    carbidopa-levodopa (PARCOPA)  mg per disintegrating tablet Take 1 tablet by mouth 3 (three) times daily. 90 tablet 11    docusate sodium (COLACE) 100 MG capsule Take 100 mg by mouth 2 (two) times daily.      donepeziL (ARICEPT) 5 MG tablet Take 2 tablets (10 mg total) by mouth every evening. 60 tablet 11    gabapentin (NEURONTIN) 300 MG capsule Take 300 mg by mouth once daily.       sertraline (ZOLOFT) 50 MG tablet Take 50 mg by mouth once daily.      warfarin (COUMADIN) 5 MG tablet Take 5 mg by mouth every Mon, Wed, Fri. Sun Tues Thurs Sat....half tablet      donepezil (ARICEPT) 5 MG tablet Take 5 mg by mouth every evening.      glimepiride (AMARYL) 2 MG tablet Take 2 mg by mouth 2 (two) times daily.      metFORMIN (GLUCOPHAGE-XR) 500 MG 24 hr tablet Take 500 mg by mouth once daily.      mirtazapine (REMERON) 15 MG tablet Take 1 tablet (15 mg total) by mouth every evening. 90 tablet 2    mirtazapine (REMERON) 15 MG tablet Take 15 mg by mouth every evening.      propranolol (INDERAL LA) 60 MG 24 hr capsule Take 60 mg by mouth once daily.      pyridoxine, vitamin B6, (VITAMIN B-6) 100 MG Tab Take 50 mg by mouth once daily.      simvastatin (ZOCOR) 40 MG tablet Take 40 mg by mouth every evening.       Current Facility-Administered Medications on File Prior to Visit   Medication Dose Route Frequency Provider Last Rate Last Dose    onabotulinumtoxina injection 100 Units  100 Units  "Intramuscular q12 weeks Kinjal Oh MD         Social History:     Social History     Tobacco Use    Smoking status: Never Smoker    Smokeless tobacco: Never Used   Substance Use Topics    Alcohol use: Yes     Alcohol/week: 1.0 standard drinks     Types: 1 Cans of beer per week     Family History:     Family History   Problem Relation Age of Onset    No Known Problems Mother     No Known Problems Father     No Known Problems Sister     Heart disease Brother     No Known Problems Maternal Aunt     No Known Problems Maternal Uncle     No Known Problems Paternal Aunt     No Known Problems Paternal Uncle     No Known Problems Maternal Grandmother     No Known Problems Maternal Grandfather     No Known Problems Paternal Grandmother     No Known Problems Paternal Grandfather     Anemia Neg Hx     Arrhythmia Neg Hx     Asthma Neg Hx     Clotting disorder Neg Hx     Fainting Neg Hx     Heart attack Neg Hx     Heart failure Neg Hx     Hyperlipidemia Neg Hx     Hypertension Neg Hx     Stroke Neg Hx     Atrial Septal Defect Neg Hx      Physical Exam:   /67 (BP Location: Left arm, Patient Position: Sitting, BP Method: Large (Automatic))   Pulse 80   Ht 5' 11" (1.803 m)   Wt 88.9 kg (195 lb 15.8 oz)   SpO2 97%   BMI 27.33 kg/m²      Constitutional: NAD, conversant  HEENT: Sclera anicteric, PERRLA, EOMI  Neck: No JVD, no carotid bruits  CV: Irregularly irregular, no murmur, normal S1/S2  Pulm: CTAB, no wheezes, rales, or ronchi  GI: Abdomen soft, NTND, +BS  Extremities: No LE edema, warm and well perfused; 2+ radial pulses B/L, 2+ DP and PT pulses B/L  Skin: No ecchymosis, erythema, or ulcers  Psych: AOx3, appropriate affect  Neuro: No gross deficits    Labs:     Lab Results   Component Value Date     10/21/2020    K 4.1 10/21/2020     10/21/2020    CO2 32 (H) 10/21/2020    BUN 20 10/21/2020    CREATININE 0.8 12/07/2020    ANIONGAP 5 (L) 10/21/2020     Lab Results   Component " Value Date    HGBA1C 5.5 12/12/2017      Lab Results   Component Value Date    WBC 6.78 12/07/2020    HGB 17.1 12/07/2020    HCT 53.2 12/07/2020     12/07/2020    GRAN 4.7 12/07/2020    GRAN 68.8 12/07/2020          Imaging:     PERLA (12/7/2020)   Pending    Assessment:     1. Paroxysmal atrial fibrillation    2. Type 2 diabetes mellitus with diabetic polyneuropathy, without long-term current use of insulin    3. Parkinsonism, unspecified Parkinsonism type    4. History of stroke      Plan:     Paroxysmal atrial fibrillation  - Currently in Afib on exam  - s/p 31 mm Watchman FLX on 10/21/2020  - Scheduled for PERLA this afternoon; if PERLA confirms successful LAAO, may stop Coumadin and start Plavix  - Continue ASA and Plavix for 6 months total (until 3/21/2021), then ASA 81 mg only  - Plavix may be interrupted prior to 3/21/2021 in order to receive Botox injections for sialorrhea/Parkinson's  - Follow up with Dr. Dimas    Diabetes mellitus with neurological manifestations  - Stable, on Metformin    Parkinsonism  - Followed by Dr. Oh  - Currently having difficulty with sialorrhea resistant to medical therapy, plan for eventual Botox injections once he is off Coumadin  - He will need cautious airway protection during his procedure    History of stroke  - Hx of embolic CVA in 2012    Signed:  Domingo Salazar MD  Advanced Interventional Cardiology Fellow, PGY-8  Pager: [phone]  12/7/2020 10:43 AM    Staff:  I have personally taken the history and examined this patient and agree with the fellow's note as stated above and amended it accordingly :-)  He has severe swallowing difficulty and needs botox injections.  If his PERLA today shows adequate BOZENA closure, will allow him to stop OAC and get Botox through a plavix window in one month. Otherwise, will continue asa and plavix for 4.5 more months.

## 2020-12-07 NOTE — HPI
73 y/o M with CVA 2012, mild cognitive impairment, throat cancer s/p radiation 2003, medication resistant Parkinsons and atrial fibrillation on warfarin. He follows with Dr Oh who is planning on botox injections for sialorrhea but would like for him to be off warfarin prior to procedure. He underwent placement of a 31 mm Watchman Flex device on 10/21 without complications. Presents today for post Watchman evaluation.    Dysphagia or odynophagia:  Yes - due to Parkinson's  Liver Disease, esophageal disease, or known varices:  No  Upper GI Bleeding: No  Snoring:  No  Sleep Apnea:  No  Prior neck surgery or radiation:  Yes  History of anesthetic difficulties:  No  Family history of anesthetic difficulties:  No  Last oral intake:  12 hours ago  Able to move neck in all directions:  Yes  Stroke History:  No  Last dose of anticoagulation:  Last Night    Anticoagulation: Coumadin  Antiplatelets: Aspirin          PERLA 10/21/20  · PERLA for watchman placememt 3D BOEZNA measurment 2.1 x 2. cm with largest depth 3.8 cm. SEC in BOZENA. no clot present. contrast used.  · Placement of a 31 watchman FLEX device with 20% compression achieved. There is no peridevice leak and no pericardial effusion at the end of the procedure. presence of iatrogenic ASD with left to right flow.  · With mildly decreased systolic function. The estimated ejection fraction is 40%.  · Normal right ventricular systolic function.  · Mild mitral regurgitation.  · No plaque present.

## 2020-12-07 NOTE — DISCHARGE SUMMARY
[facility] Short Stay Cardiac Unit  Cardiology  Discharge Summary      Patient Name: Clint Torres  MRN: [id number]  Admission Date: 12/7/2020  Hospital Length of Stay: 0 days  Discharge Date and Time:  12/07/2020 3:17 PM  Attending Physician: Preet Saul MD    Discharging Provider: Dharmesh Starr MD  Primary Care Physician: Grzegorz Echavarria MD    HPI:   73 y/o M with CVA 2012, mild cognitive impairment, throat cancer s/p radiation 2003, medication resistant Parkinsons and atrial fibrillation on warfarin. He follows with Dr Oh who is planning on botox injections for sialorrhea but would like for him to be off warfarin prior to procedure. He underwent placement of a 31 mm Watchman Flex device on 10/21 without complications. Presents today for post Watchman evaluation.    Dysphagia or odynophagia:  Yes - due to Parkinson's  Liver Disease, esophageal disease, or known varices:  No  Upper GI Bleeding: No  Snoring:  No  Sleep Apnea:  No  Prior neck surgery or radiation:  Yes  History of anesthetic difficulties:  No  Family history of anesthetic difficulties:  No  Last oral intake:  12 hours ago  Able to move neck in all directions:  Yes  Stroke History:  No  Last dose of anticoagulation:  Last Night    Anticoagulation: Coumadin  Antiplatelets: Aspirin          PERLA 10/21/20  · PERLA for watchman placememt 3D BOZENA measurment 2.1 x 2. cm with largest depth 3.8 cm. SEC in BOZENA. no clot present. contrast used.  · Placement of a 31 watchman FLEX device with 20% compression achieved. There is no peridevice leak and no pericardial effusion at the end of the procedure. presence of iatrogenic ASD with left to right flow.  · With mildly decreased systolic function. The estimated ejection fraction is 40%.  · Normal right ventricular systolic function.  · Mild mitral regurgitation.  · No plaque present.      Procedure(s) (LRB):  ECHOCARDIOGRAM,TRANSESOPHAGEAL (N/A)     Indwelling Lines/Drains at time of  discharge:  Lines/Drains/Airways     None                 Hospital Course:  Patient underwent PERLA for post Watchman evaluation. No device-related thrombus or nona leak observed. Discussed with IC. Patient will stop warfarin. Continue aspirin and start Plavix.       Final Active Diagnoses:    Diagnosis Date Noted POA    PRINCIPAL PROBLEM:  Presence of Watchman left atrial appendage closure device [Z95.818] 12/07/2020 Yes    Atrial fibrillation [I48.91] 10/21/2020 Yes      Problems Resolved During this Admission:       Discharged Condition: good    Disposition: Home or Self Care    Medications:  Reconciled Home Medications:      Medication List      CONTINUE taking these medications    aspirin 81 MG EC tablet  Commonly known as: ECOTRIN  Take 81 mg by mouth once daily.     atropine 1% 1 % Drop  Commonly known as: ISOPTO ATROPINE  Put 2 drops on tongue for sialorrhea     carbidopa-levodopa  mg per disintegrating tablet  Commonly known as: PARCOPA  Take 1 tablet by mouth 3 (three) times daily.     clopidogreL 75 mg tablet  Commonly known as: PLAVIX  Take 1 tablet (75 mg total) by mouth once daily.     docusate sodium 100 MG capsule  Commonly known as: COLACE  Take 100 mg by mouth 2 (two) times daily.     * donepeziL 5 MG tablet  Commonly known as: ARICEPT  Take 5 mg by mouth every evening.     * donepeziL 5 MG tablet  Commonly known as: ARICEPT  Take 2 tablets (10 mg total) by mouth every evening.     gabapentin 300 MG capsule  Commonly known as: NEURONTIN  Take 300 mg by mouth once daily.     glimepiride 2 MG tablet  Commonly known as: AMARYL  Take 2 mg by mouth 2 (two) times daily.     metFORMIN 500 MG ER 24hr tablet  Commonly known as: GLUCOPHAGE-XR  Take 500 mg by mouth once daily.     * mirtazapine 15 MG tablet  Commonly known as: REMERON  Take 15 mg by mouth every evening.     * mirtazapine 15 MG tablet  Commonly known as: REMERON  Take 1 tablet (15 mg total) by mouth every evening.     propranoloL 60 MG  24 hr capsule  Commonly known as: INDERAL LA  Take 60 mg by mouth once daily.     sertraline 50 MG tablet  Commonly known as: ZOLOFT  Take 50 mg by mouth once daily.     simvastatin 40 MG tablet  Commonly known as: ZOCOR  Take 40 mg by mouth every evening.     VITAMIN B-6 100 MG Tab  Generic drug: pyridoxine (vitamin B6)  Take 50 mg by mouth once daily.         * This list has 4 medication(s) that are the same as other medications prescribed for you. Read the directions carefully, and ask your doctor or other care provider to review them with you.            STOP taking these medications    warfarin 5 MG tablet  Commonly known as: COUMADIN            Time spent on the discharge of patient: 31 minutes    Dharmesh Starr MD  Cardiology  [facility] Stay Cardiac [facility]

## 2020-12-07 NOTE — NURSING
Patient discharged per MD orders. Instructions given on medications, wound care, activity, signs of infection, when to call MD, and follow up appointments. Pt verbalized understanding.  Patient AAOx4, VSS, no c/o pain or discomfort at this time. Telemetry and PIV removed. Patient left unit via wheelchair with transport and family.

## 2020-12-07 NOTE — H&P
[facility]  Cardiology  History and Physical     Patient Name: Clint Torres  MRN: [id number]  Admission Date: 12/7/2020  Code Status: No Order   Attending Provider: Preet Saul MD   Primary Care Physician: Grzegorz Echavarria MD  Principal Problem:Presence of Watchman left atrial appendage closure device    Patient information was obtained from patient and past medical records.     Subjective:     Chief Complaint:  Post Watchman     HPI:  73 y/o M with CVA 2012, mild cognitive impairment, throat cancer s/p radiation 2003, medication resistant Parkinsons and atrial fibrillation on warfarin. He follows with Dr Oh who is planning on botox injections for sialorrhea but would like for him to be off warfarin prior to procedure. He underwent placement of a 31 mm Watchman Flex device on 10/21 without complications. Presents today for post Watchman evaluation.    Dysphagia or odynophagia:  Yes - due to Parkinson's  Liver Disease, esophageal disease, or known varices:  No  Upper GI Bleeding: No  Snoring:  No  Sleep Apnea:  No  Prior neck surgery or radiation:  Yes  History of anesthetic difficulties:  No  Family history of anesthetic difficulties:  No  Last oral intake:  12 hours ago  Able to move neck in all directions:  Yes  Stroke History:  No  Last dose of anticoagulation:  Last Night    Anticoagulation: Coumadin  Antiplatelets: Aspirin          PERLA 10/21/20  · PERLA for watchman placememt 3D BOZENA measurment 2.1 x 2. cm with largest depth 3.8 cm. SEC in BOZENA. no clot present. contrast used.  · Placement of a 31 watchman FLEX device with 20% compression achieved. There is no peridevice leak and no pericardial effusion at the end of the procedure. presence of iatrogenic ASD with left to right flow.  · With mildly decreased systolic function. The estimated ejection fraction is 40%.  · Normal right ventricular systolic function.  · Mild mitral regurgitation.  · No plaque  present.      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 CATH LAB;  Service: Cardiology;  Laterality: N/A;    DISSECTION OF NECK      tonsillectomy         Review of patient's allergies indicates:  No Known Allergies    No current facility-administered medications on file prior to encounter.      Current Outpatient Medications on File Prior to Encounter   Medication Sig    aspirin (ECOTRIN) 81 MG EC tablet Take 81 mg by mouth once daily.    atropine 1% (ISOPTO ATROPINE) 1 % Drop Put 2 drops on tongue for sialorrhea    carbidopa-levodopa (PARCOPA)  mg per disintegrating tablet Take 1 tablet by mouth 3 (three) times daily.    docusate sodium (COLACE) 100 MG capsule Take 100 mg by mouth 2 (two) times daily.    donepezil (ARICEPT) 5 MG tablet Take 5 mg by mouth every evening.    donepeziL (ARICEPT) 5 MG tablet Take 2 tablets (10 mg total) by mouth every evening.    gabapentin (NEURONTIN) 300 MG capsule Take 300 mg by mouth once daily.     glimepiride (AMARYL) 2 MG tablet Take 2 mg by mouth 2 (two) times daily.    metFORMIN (GLUCOPHAGE-XR) 500 MG 24 hr tablet Take 500 mg by mouth once daily.    mirtazapine (REMERON) 15 MG tablet Take 1 tablet (15 mg total) by mouth every evening.    mirtazapine (REMERON) 15 MG tablet Take 15 mg by mouth every evening.    propranolol (INDERAL LA) 60 MG 24 hr capsule Take 60 mg by mouth once daily.     pyridoxine, vitamin B6, (VITAMIN B-6) 100 MG Tab Take 50 mg by mouth once daily.    sertraline (ZOLOFT) 50 MG tablet Take 50 mg by mouth once daily.    simvastatin (ZOCOR) 40 MG tablet Take 40 mg by mouth every evening.    [DISCONTINUED] warfarin (COUMADIN) 5 MG tablet Take 5 mg by mouth every Mon, Wed, Fri. Sun Tues Thurs Sat....half tablet     Family History     Problem Relation (Age of Onset)    Heart disease Brother    No Known Problems Mother, Father, Sister, Maternal Aunt, Maternal Uncle, Paternal Aunt, Paternal Uncle, Maternal Grandmother, Maternal Grandfather, Paternal Grandmother, Paternal Grandfather        Tobacco Use    Smoking status: Never Smoker    Smokeless tobacco: Never Used   Substance and Sexual Activity    Alcohol use: Yes     Alcohol/week: 1.0 standard drinks     Types: 1 Cans of beer per week    Drug use: Never    Sexual activity: Not on file     Review of Systems   Constitution: Negative for chills and fever.   Cardiovascular: Negative for chest pain, dyspnea on exertion, palpitations and syncope.   Respiratory: Negative for cough and sleep disturbances due to breathing.    Musculoskeletal: Negative for back pain.   Gastrointestinal: Negative for abdominal pain, nausea and vomiting.   Neurological: Negative for dizziness and focal weakness.   Psychiatric/Behavioral: Negative for altered mental status.     Objective:     Vital Signs (Most Recent):    Vital Signs (24h Range):  Pulse:  [80] 80  SpO2:  [97 %] 97 %  BP: ()/(67-71) 101/67        There is no height or weight on file to calculate BMI.            No intake or output data in the 24 hours ending 12/07/20 1141    Lines/Drains/Airways     None                 Physical Exam   Constitutional: He is oriented to person, place, and time. He appears well-developed and well-nourished. No distress.   HENT:   Head: Normocephalic and atraumatic.   Eyes: Conjunctivae and EOM are normal.   Neck: Normal range of motion. No tracheal  deviation present.   Cardiovascular: Normal rate, regular rhythm and normal heart sounds.   No murmur heard.  Pulmonary/Chest: Effort normal and breath sounds normal. No respiratory distress. He has no wheezes.   Abdominal: Soft. Bowel sounds are normal. He exhibits no distension. There is no abdominal tenderness.   Musculoskeletal: Normal range of motion.         General: No edema.   Neurological: He is alert and oriented to person, place, and time.   Skin: He is not diaphoretic.       Significant Labs: All pertinent lab results from the last 24 hours have been reviewed.    Significant Imaging: All pertinent images from the last 24h have been reviewed.      Assessment and Plan:     * Presence of Watchman left atrial appendage closure device  1. PERLA for evaluation of post Watchman  -No absolute contraindications of esophageal stricture, tumor, perforation, laceration,or diverticulum and/or active GI bleed  -The risks, benefits & alternatives of the procedure were explained to the patient.   -The risks of transesophageal echo include but are not limited to:  Dental trauma, esophageal trauma/perforation, bleeding, laryngospasm/brochospasm, aspiration, sore throat/hoarseness, & dislodgement of the endotracheal tube/nasogastric tube (where applicable).    -The risks of moderate sedation include hypotension, respiratory depression, arrhythmias, bronchospasm, & death.    -Informed consent was obtained. The patient is agreeable to proceed with the procedure and all questions and concerns addressed.    Case discussed with an attending in echocardiography lab.     Further recommendations per attending addendum      VTE Risk Mitigation (From admission, onward)    None          Dharmesh Starr MD  Cardiology   [facility]

## 2020-12-07 NOTE — NURSING
KRISTI Herndon updated on Patient BP of 70-80's over 50's. Awaiting further instruction. Pt A&Ox4, no complaints of pain or discomfort at this time. Wife at bedside. Will continue to monitor.

## 2020-12-07 NOTE — NURSING
Received report from Katrina BERRY/Sintia PERLA RN. Patient s/p PERLA, sedated. VSS, no c/o pain or discomfort at this time, resp even and unlabored. Post procedure protocol reviewed with patient and called patient's wife. Understanding verbalized. Family members at bedside. Nurse call bell within reach. Will continue to monitor per post procedure protocol.

## 2020-12-07 NOTE — SUBJECTIVE & OBJECTIVE
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    History of head and neck radiation     PD (Parkinson's disease) 11/14/2017 2013 right hand tremor, freezing of gait Tried: sinemet, entacapone, primidone, rasagiline, neupro patch, amantadine, pramipexole    Stroke     Tonsil cancer        Past Surgical History:   Procedure Laterality Date    CLOSURE OF LEFT ATRIAL APPENDAGE USING DEVICE N/A 10/21/2020    Procedure: Left atrial appendage closure device;  Surgeon: Preet Saul MD;  Location: [facility] CATH LAB;  Service: Cardiology;  Laterality: N/A;    DISSECTION OF NECK      tonsillectomy         Review of patient's allergies indicates:  No Known Allergies    No current facility-administered medications on file prior to encounter.      Current Outpatient Medications on File Prior to Encounter   Medication Sig    aspirin (ECOTRIN) 81 MG EC tablet Take 81 mg by mouth once daily.    atropine 1% (ISOPTO ATROPINE) 1 % Drop Put 2 drops on tongue for sialorrhea    carbidopa-levodopa (PARCOPA)  mg per disintegrating tablet Take 1 tablet by mouth 3 (three) times daily.    docusate sodium (COLACE) 100 MG capsule Take 100 mg by mouth 2 (two) times daily.    donepezil (ARICEPT) 5 MG tablet Take 5 mg by mouth every evening.    donepeziL (ARICEPT) 5 MG tablet Take 2 tablets (10 mg total) by mouth every evening.    gabapentin (NEURONTIN) 300 MG capsule Take 300 mg by mouth once daily.     glimepiride (AMARYL) 2 MG tablet Take 2 mg by mouth 2 (two) times daily.    metFORMIN (GLUCOPHAGE-XR) 500 MG 24 hr tablet Take 500 mg by mouth once daily.    mirtazapine (REMERON) 15 MG tablet Take 1 tablet (15 mg total) by mouth every evening.    mirtazapine (REMERON) 15 MG tablet Take 15 mg by mouth every evening.    propranolol (INDERAL LA) 60 MG 24 hr capsule Take 60 mg by mouth once daily.    pyridoxine,  vitamin B6, (VITAMIN B-6) 100 MG Tab Take 50 mg by mouth once daily.    sertraline (ZOLOFT) 50 MG tablet Take 50 mg by mouth once daily.    simvastatin (ZOCOR) 40 MG tablet Take 40 mg by mouth every evening.    [DISCONTINUED] warfarin (COUMADIN) 5 MG tablet Take 5 mg by mouth every Mon, Wed, Fri. Sun Tues Thurs Sat....half tablet     Family History     Problem Relation (Age of Onset)    Heart disease Brother    No Known Problems Mother, Father, Sister, Maternal Aunt, Maternal Uncle, Paternal Aunt, Paternal Uncle, Maternal Grandmother, Maternal Grandfather, Paternal Grandmother, Paternal Grandfather        Tobacco Use    Smoking status: Never Smoker    Smokeless tobacco: Never Used   Substance and Sexual Activity    Alcohol use: Yes     Alcohol/week: 1.0 standard drinks     Types: 1 Cans of beer per week    Drug use: Never    Sexual activity: Not on file     Review of Systems   Constitution: Negative for chills and fever.   Cardiovascular: Negative for chest pain, dyspnea on exertion, palpitations and syncope.   Respiratory: Negative for cough and sleep disturbances due to breathing.    Musculoskeletal: Negative for back pain.   Gastrointestinal: Negative for abdominal pain, nausea and vomiting.   Neurological: Negative for dizziness and focal weakness.   Psychiatric/Behavioral: Negative for altered mental status.     Objective:     Vital Signs (Most Recent):    Vital Signs (24h Range):  Pulse:  [80] 80  SpO2:  [97 %] 97 %  BP: ()/(67-71) 101/67        There is no height or weight on file to calculate BMI.            No intake or output data in the 24 hours ending 12/07/20 1141    Lines/Drains/Airways     None                 Physical Exam   Constitutional: He is oriented to person, place, and time. He appears well-developed and well-nourished. No distress.   HENT:   Head: Normocephalic and atraumatic.   Eyes: Conjunctivae and EOM are normal.   Neck: Normal range of motion. No tracheal deviation  present.   Cardiovascular: Normal rate, regular rhythm and normal heart sounds.   No murmur heard.  Pulmonary/Chest: Effort normal and breath sounds normal. No respiratory distress. He has no wheezes.   Abdominal: Soft. Bowel sounds are normal. He exhibits no distension. There is no abdominal tenderness.   Musculoskeletal: Normal range of motion.         General: No edema.   Neurological: He is alert and oriented to person, place, and time.   Skin: He is not diaphoretic.       Significant Labs: All pertinent lab results from the last 24 hours have been reviewed.    Significant Imaging: All pertinent images from the last 24h have been reviewed.

## 2020-12-07 NOTE — TRANSFER OF CARE
"Anesthesia Transfer of Care Note    Patient: Clint Torres    Procedure(s) Performed: Procedure(s) (LRB):  ECHOCARDIOGRAM,TRANSESOPHAGEAL (N/A)    Patient location: Cath Lab    Anesthesia Type: general    Transport from OR: Transported from OR on 2-3 L/min O2 by NC with adequate spontaneous ventilation    Post pain: adequate analgesia    Post assessment: no apparent anesthetic complications and tolerated procedure well    Post vital signs: stable    Level of consciousness: responds to stimulation (voice)    Nausea/Vomiting: no nausea/vomiting    Complications: none    Transfer of care protocol was followed      Last vitals:   Visit Vitals  /71 (BP Location: Right arm, Patient Position: Lying)   Pulse 87   Temp 36.1 °C (96.9 °F) (Oral)   Resp 18   Ht 5' 11.5" (1.816 m)   Wt 88.9 kg (196 lb)   SpO2 99%   BMI 26.96 kg/m²     "

## 2020-12-08 LAB — SARS-COV-2 RNA RESP QL NAA+PROBE: NOT DETECTED

## 2020-12-15 ENCOUNTER — PATIENT MESSAGE (OUTPATIENT)
Dept: NEUROLOGY | Facility: CLINIC | Age: 75
End: 2020-12-15

## 2020-12-22 ENCOUNTER — PROCEDURE VISIT (OUTPATIENT)
Dept: NEUROLOGY | Facility: CLINIC | Age: 75
End: 2020-12-22
Payer: MEDICARE

## 2020-12-22 VITALS
HEIGHT: 71 IN | HEART RATE: 90 BPM | DIASTOLIC BLOOD PRESSURE: 74 MMHG | BODY MASS INDEX: 27.04 KG/M2 | WEIGHT: 193.13 LBS | SYSTOLIC BLOOD PRESSURE: 101 MMHG

## 2020-12-22 DIAGNOSIS — K11.7 SIALORRHEA: ICD-10-CM

## 2020-12-22 DIAGNOSIS — G20.C PARKINSONISM, UNSPECIFIED PARKINSONISM TYPE: Primary | ICD-10-CM

## 2020-12-22 PROCEDURE — 64611 CHEMODENERV SALIV GLANDS: CPT | Mod: S$PBB,,, | Performed by: PSYCHIATRY & NEUROLOGY

## 2020-12-22 PROCEDURE — 64611 PR CHEMODENERVATION PAROTID/SUBMANDIBULAR SALIVARY GLANDS,BILATERAL: ICD-10-PCS | Mod: S$PBB,,, | Performed by: PSYCHIATRY & NEUROLOGY

## 2020-12-22 PROCEDURE — 64611 CHEMODENERV SALIV GLANDS: CPT | Mod: PBBFAC | Performed by: PSYCHIATRY & NEUROLOGY

## 2020-12-22 RX ADMIN — ONABOTULINUMTOXINA 100 UNITS: 100 INJECTION, POWDER, LYOPHILIZED, FOR SOLUTION INTRADERMAL; INTRAMUSCULAR at 05:12

## 2020-12-22 NOTE — PROCEDURES
"  MOVEMENT DISORDERS CLINIC    PCP/Referring Provider: Kinjal Oh MD  [id number] [place] [address]  Date of Service: 12/22/2020    Chief Complaint: Tremor    Interval Hx    Doing well on parcopa 1 tab TID  Increased to donepezil 10mg QHS - no issues    For sialorrhea - tried atropine drops which did not help - aslo scopalamine pacth  Here for botox today    Does not use an assist device - no falls    R hand resting tremor ongoing    Contnues to have significant bradyphrenia  No hallucinations - memory is declining    Med hx  4 tabs rytary 95mgQID made him lethargic and nauseated  No hallucinations noted    PD Review of Symptoms:  Anosmia: yes  Dysarthria/Hypophonia: moderate  Dysphagia/Sialorrhea: Yes,   Depression: zoloft  Cognitive slowing: As above  Hallucinations: none  Impulsivity: none  Urinary changes: retension  Constipation: yes  Orthostasis: at times  Dyskinesia: none  Falls: yes  Freezing: yes  Micrographia: yes  Sleep issues:  -RBD: None      "PriorHPI: Clint Torres is a R HANDED 75 y.o. male with a medical issues significant for Afib on Coumadin, CVA, MCI, throat cancer s/p radiation, with parkinsonism, coming for DBS eval. Daughter noted resting tremor of R hand 5 years ago which has slowly increased over time. At this point he cannot eat or write due to severity of tremor. R hand remains worse than L however he is R handed. His gait slowed soon after. He does not fall often. Does not use a walker or a cane. Can walk 1 block. FOG started 2 years ago. Feet stick to floor and causes imbalance.  Neuro psych eval has noticed MCI with extremely slow processing speed.    For tremor has tried  Sinemet (up to 3 tabs at a time) , primidone, rasagiline, neupro patch, amantadine, pramipexole, entacapone  Nothing has significantly helped - he stopped all therapies since it did not help tremor    Tried PT and [employer]    Neuroleptic exposure:  None"        Review of Systems:   Review of " Systems   Constitutional: Negative for fever.   HENT: Negative for congestion.    Eyes: Negative for double vision.   Respiratory: Negative for cough and shortness of breath.    Cardiovascular: Negative for chest pain and leg swelling.   Gastrointestinal: Negative for nausea.   Genitourinary: Negative for dysuria.   Musculoskeletal: Positive for falls.   Skin: Negative for rash.   Neurological: Positive for tremors and speech change. Negative for headaches.   Psychiatric/Behavioral: Positive for memory loss. Negative for depression.         Current Medications:  Outpatient Encounter Medications as of 12/22/2020   Medication Sig Dispense Refill    aspirin (ECOTRIN) 81 MG EC tablet Take 81 mg by mouth once daily.      atropine 1% (ISOPTO ATROPINE) 1 % Drop Put 2 drops on tongue for sialorrhea 1 Bottle 5    carbidopa-levodopa (PARCOPA)  mg per disintegrating tablet Take 1 tablet by mouth 3 (three) times daily. 90 tablet 11    clopidogreL (PLAVIX) 75 mg tablet Take 1 tablet (75 mg total) by mouth once daily. 30 tablet 5    docusate sodium (COLACE) 100 MG capsule Take 100 mg by mouth 2 (two) times daily.      donepeziL (ARICEPT) 5 MG tablet Take 2 tablets (10 mg total) by mouth every evening. 60 tablet 11    gabapentin (NEURONTIN) 300 MG capsule Take 300 mg by mouth once daily.       sertraline (ZOLOFT) 50 MG tablet Take 50 mg by mouth once daily.      donepezil (ARICEPT) 5 MG tablet Take 5 mg by mouth every evening.      glimepiride (AMARYL) 2 MG tablet Take 2 mg by mouth 2 (two) times daily.      metFORMIN (GLUCOPHAGE-XR) 500 MG 24 hr tablet Take 500 mg by mouth once daily.      mirtazapine (REMERON) 15 MG tablet Take 1 tablet (15 mg total) by mouth every evening. 90 tablet 2    mirtazapine (REMERON) 15 MG tablet Take 15 mg by mouth every evening.      propranolol (INDERAL LA) 60 MG 24 hr capsule Take 60 mg by mouth once daily.      pyridoxine, vitamin B6, (VITAMIN B-6) 100 MG Tab Take 50 mg by  mouth once daily.      simvastatin (ZOCOR) 40 MG tablet Take 40 mg by mouth every evening.       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    Sleep-wake disorder    Acute respiratory failure    Alcohol abuse    Altered mental status    CHF (congestive heart failure)    Diabetes    Dyslipidemia    SIRS (systemic inflammatory response syndrome)    Fever    Long term (current) use of anticoagulants    Atrial fibrillation    Presence of Watchman left atrial appendage closure device    Sialorrhea       Past Surgical History:  Past Surgical History:   Procedure Laterality Date    CLOSURE OF LEFT ATRIAL APPENDAGE USING DEVICE N/A 10/21/2020    Procedure: Left atrial appendage closure device;  Surgeon: Preet Saul MD;  Location: [facility] CATH LAB;  Service: Cardiology;  Laterality: N/A;    DISSECTION OF NECK      tonsillectomy         Current Living Situation: home with daughter    Social:  Social History     Socioeconomic History    Marital status:      Spouse name: Not on file    Number of children: Not on file    Years of education: Not on file    Highest education level: Not on file   Occupational History    Not on file   Social Needs    Financial resource strain: Not on file    Food insecurity     Worry: Not on file     Inability: Not on file    Transportation needs     Medical: Not on file      "Non-medical: Not on file   Tobacco Use    Smoking status: Never Smoker    Smokeless tobacco: Never Used   Substance and Sexual Activity    Alcohol use: Yes     Alcohol/week: 1.0 standard drinks     Types: 1 Cans of beer per week    Drug use: Never    Sexual activity: Not on file   Lifestyle    Physical activity     Days per week: Not on file     Minutes per session: Not on file    Stress: Not on file   Relationships    Social connections     Talks on phone: Not on file     Gets together: Not on file     Attends Bahai service: Not on file     Active member of club or organization: Not on file     Attends meetings of clubs or organizations: Not on file     Relationship status: Not on file   Other Topics Concern    Not on file   Social History Narrative    Not on file       Family History:  Family History   Problem Relation Age of Onset    No Known Problems Mother     No Known Problems Father     No Known Problems Sister     Heart disease Brother     No Known Problems Maternal Aunt     No Known Problems Maternal Uncle     No Known Problems Paternal Aunt     No Known Problems Paternal Uncle     No Known Problems Maternal Grandmother     No Known Problems Maternal Grandfather     No Known Problems Paternal Grandmother     No Known Problems Paternal Grandfather     Anemia Neg Hx     Arrhythmia Neg Hx     Asthma Neg Hx     Clotting disorder Neg Hx     Fainting Neg Hx     Heart attack Neg Hx     Heart failure Neg Hx     Hyperlipidemia Neg Hx     Hypertension Neg Hx     Stroke Neg Hx     Atrial Septal Defect Neg Hx        PHYSICAL:  /74   Pulse 90   Ht 5' 11" (1.803 m)   Wt 87.6 kg (193 lb 2 oz)   BMI 26.94 kg/m²     Physical Exam  Constitutional: Well-developed, well-nourished, appears stated age  Eyes: No scleral icterus  ENT: Moist oral mucosa  Cardiovascular: No lower extremity edema   Respiratory: No labored breathing   Skin: No rash   Hematologic: No " bruising    Sialorrhea    Other: GI/ deferred   · Mental status:  Awake, alert. Anterocollis, slow processing speed, sialorrhea profound  · Speech: normal (not dysarthric), no aphasia  · Cranial nerves:            · CN II: Pupils mid-position and equal, not tested light or accommodation  · CN III, IV, VI: Upgaze restricted 100% R beating nystagmus visualized  · CN V: Not tested   · CN VII: Face strong and symmetric bilaterally   · CN VIII: Hearing intact to voice and conversation   · CN IX, X: Palate raises midline and symmetric   · CN XI: Strong shoulder shrug B/L  · CN XII: Tongue appears midline   · Motor: Normal bulk by appearance, no drift   · Sensory: Not tested    · Gait: Moderate stoop with anterocollis - moderately wide gait - mild shuffle  · Deep tendon reflexes: Not tested  · Movement/Coordination                    Mod hypophonic speech.                     Severe facial masking with sialorrhea  Moderate tremor R hand    Bradykinesia  ? Finger taps Finger flicks KRISTIE Heel taps   Left 1+ 1+ - -   Right 3+ 3+ - -         Laboratory Data:  NA    Imaging:  SPENCER with vascular lacunes Midbrain and BG bilaterally    Neuropsych eval Recs  Current Assessment & Plan        -Neuropsych testing showed extremely slow processing speed, but normal orientation to time and recall/memory. Additionally, his attention/working memory and verbal reasoning (ability to solve problems talking through them without speed requirements) was normal. Language skills were also normal.   -Diagnostically, he is on the borderzone between Mild Cognitive Impairment and Dementia, but his functional impairment is likely more due to motor sxs 2/2 PD than cognitive impairment at this time. So, will keep MCI dx.   -No concerns about capacity and no cognitive/behavioral contraindications prior to DBS.   -Importantly, recommend very slow discussion of DBS and asking him to restate/summarize to ensure adequate comprehension given his severely slow  processing speed.   -See other recs below in report        Procedure:   Patient was prepped in usual fashion with alcohol pads.   EMG was not used   ?  100 units of onobotulinum toxin was prepared  50 units were wasted.   Muscles and doses that were injected:     Patients signed consent forms and we answered all questions about risks and benefits of botox injections. They agreed to the procedure.  ? L R   ?parotid ?25U ?25U   ? ? ?        ? ? ?   ? ? ?   The patient tolerated the procedure well and there were no immediate complications following the procedure.         Assessment//Plan:   Problem List Items Addressed This Visit        Neuro    Parkinsonism - Primary    Overview     2013 right hand tremor, freezing of gait. Predominantly upper extremities  Failed: sinemet, entacapone, primidone, rasagiline, neupro patch, amantadine, pramipexole, topiramate         Current Assessment & Plan     Medication-resistant PDism, with atypical features of FOG, slow processing speed. MRI brain revealed what appears as vascular PDism, explainig his atypical symptoms. He does however have progressive dysphagia to suggest he does still have a neurodegenerative disease. Gait and FOG specifically are his major issues.   Suggested continue parcopa 25/100PO TID for safety.    Continue donepezil 10mg QHS for balance improvement. Suggested aggressive PT.           Relevant Orders    Ambulatory referral/consult to Physical/Occupational Therapy       ENT    Sialorrhea    Current Assessment & Plan     Tolerated botox well               Follow-up: 2mos  Discussed the importance of ongoing exercise in efforts to improve mobility and balance.    Kinjal Oh MD, MS  [facility]  Department of Neurology  Movement Disorders    Procedures

## 2020-12-22 NOTE — ASSESSMENT & PLAN NOTE
Medication-resistant PDism, with atypical features of FOG, slow processing speed. MRI brain revealed what appears as vascular PDism, explainig his atypical symptoms. He does however have progressive dysphagia to suggest he does still have a neurodegenerative disease. Gait and FOG specifically are his major issues.   Suggested continue parcopa 25/100PO TID for safety.    Continue donepezil 10mg QHS for balance improvement. Suggested aggressive PT.

## 2021-03-18 ENCOUNTER — PATIENT MESSAGE (OUTPATIENT)
Dept: NEUROLOGY | Facility: CLINIC | Age: 76
End: 2021-03-18

## 2021-03-18 DIAGNOSIS — G20.C PARKINSONISM, UNSPECIFIED PARKINSONISM TYPE: Primary | ICD-10-CM

## 2021-03-23 ENCOUNTER — TELEPHONE (OUTPATIENT)
Dept: NEUROLOGY | Facility: CLINIC | Age: 76
End: 2021-03-23

## 2021-03-24 ENCOUNTER — TELEPHONE (OUTPATIENT)
Dept: NEUROLOGY | Facility: CLINIC | Age: 76
End: 2021-03-24

## 2021-03-29 ENCOUNTER — PATIENT MESSAGE (OUTPATIENT)
Dept: NEUROLOGY | Facility: CLINIC | Age: 76
End: 2021-03-29

## 2021-03-30 ENCOUNTER — TELEPHONE (OUTPATIENT)
Dept: NEUROLOGY | Facility: CLINIC | Age: 76
End: 2021-03-30

## 2021-03-30 ENCOUNTER — PATIENT MESSAGE (OUTPATIENT)
Dept: NEUROLOGY | Facility: CLINIC | Age: 76
End: 2021-03-30

## 2021-03-30 DIAGNOSIS — G20.A1 PARKINSON'S DISEASE: Primary | ICD-10-CM

## 2022-02-21 ENCOUNTER — PATIENT MESSAGE (OUTPATIENT)
Dept: NEUROLOGY | Facility: CLINIC | Age: 77
End: 2022-02-21
Payer: MEDICARE

## 2022-10-11 ENCOUNTER — DOCUMENTATION ONLY (OUTPATIENT)
Dept: CARDIOLOGY | Facility: CLINIC | Age: 77
End: 2022-10-11
Payer: MEDICARE

## 2022-10-12 ENCOUNTER — TELEPHONE (OUTPATIENT)
Dept: CARDIAC CATH/INVASIVE PROCEDURES | Facility: HOSPITAL | Age: 77
End: 2022-10-12
Payer: MEDICARE

## 2022-10-12 NOTE — TELEPHONE ENCOUNTER
Patient called for 2 year Watchman follow up. Wife says he is unable to speak because of his Parkinsons. She says that he is doing well otherwise. Continues on ASA alone.

## (undated) DEVICE — DEVICE PERCLOSE SUT CLSR 6FR

## (undated) DEVICE — SYS WATCHMAN FXD CURVE DBL US

## (undated) DEVICE — SHEATH INTRODUCER 8FR 11CM

## (undated) DEVICE — KIT CUSTOM MANIFOLD

## (undated) DEVICE — KIT MICROINTRO 4F .018X40X7CM

## (undated) DEVICE — CATH DXTERITY PG145 110CM 6FR

## (undated) DEVICE — GUIDEWIRE TORAY INOUE

## (undated) DEVICE — GUIDEWIRE AMPLATZ .035X260

## (undated) DEVICE — CATH SUPER TORQUE MP 4FRX80CM

## (undated) DEVICE — GUIDEWIRE AMPLATZ STF .032X260

## (undated) DEVICE — NDL TRANSEPTAL ADULT 71.0

## (undated) DEVICE — OMNIPAQUE 350 200ML

## (undated) DEVICE — SPIKE CONTRAST CONTROLLER

## (undated) DEVICE — PROTECTION STATION PLUS

## (undated) DEVICE — SEE MEDLINE ITEM 156894

## (undated) DEVICE — SHEATH 8FR MULLINS TRANS